# Patient Record
Sex: MALE | Race: BLACK OR AFRICAN AMERICAN | NOT HISPANIC OR LATINO | Employment: OTHER | URBAN - METROPOLITAN AREA
[De-identification: names, ages, dates, MRNs, and addresses within clinical notes are randomized per-mention and may not be internally consistent; named-entity substitution may affect disease eponyms.]

---

## 2020-01-28 NOTE — PLAN OF CARE
(Physician in Lead of Transfers)   Outside Transfer Acceptance Note / Regional Referral Center      Upon patient arrival to floor, please call extension [phone] (if no answer, this will flip to a beeper, so enter your call back number) for Hospital Medicine admit team assignment and for additional admit orders for the patient.  Do not page the attending physician associated with the patient on arrival (this physician may not be on duty at the time of arrival).  Rather, always call [phone] to reach the triage physician for orders and team assignment.      Transferring Physician:  Dr. Fox    Accepting Physician: Susan Aviles MD    Date of Acceptance: 01/28/2020    Transferring Facility: [facility]    Reason for Transfer: Needs hepatology    Report from Transferring Physician/Hospital course: 53 y/o male presenting the the above ER with approximately 1 week history of nausea, vomiting, weakness, abdominal pain and jaundice. Presented on 1/24 but labs not drawn at that time as he eloped. Returned tonight due to above symptoms. Bili 23.3, AST 1771, , Alk ohos 136. Hep C positive. Denies any alcohol or tylenol use. Hemodynamically stable. Transferring for hepatology evaluation for new liver failure.       Labs & Radiographs: see transfer records      To Do List:   1) Vitals q15 mins during the 1st hr of arrival then q30 mins during the 2nd hr   2) Telemetry  3) Hepatology consult      Susan Aviles MD  Hospital Medicine Staff

## 2020-01-29 ENCOUNTER — HOSPITAL ENCOUNTER (INPATIENT)
Facility: HOSPITAL | Age: 55
LOS: 1 days | Discharge: LEFT BEFORE DISCHARGE | DRG: 443 | End: 2020-01-30
Attending: HOSPITALIST | Admitting: HOSPITALIST
Payer: MEDICAID

## 2020-01-29 DIAGNOSIS — R17 JAUNDICE: ICD-10-CM

## 2020-01-29 DIAGNOSIS — K72.90 LIVER FAILURE: ICD-10-CM

## 2020-01-29 DIAGNOSIS — K72.00 ACUTE LIVER FAILURE WITHOUT HEPATIC COMA: ICD-10-CM

## 2020-01-29 DIAGNOSIS — B18.2 CHRONIC HEPATITIS C WITHOUT HEPATIC COMA: ICD-10-CM

## 2020-01-29 DIAGNOSIS — K72.00 ACUTE LIVER FAILURE: ICD-10-CM

## 2020-01-29 PROBLEM — K21.9 GASTROESOPHAGEAL REFLUX DISEASE WITHOUT ESOPHAGITIS: Status: ACTIVE | Noted: 2020-01-29

## 2020-01-29 PROBLEM — Z72.0 TOBACCO ABUSE: Status: ACTIVE | Noted: 2020-01-29

## 2020-01-29 PROBLEM — B17.9 ACUTE HEPATITIS: Status: ACTIVE | Noted: 2020-01-29

## 2020-01-29 LAB
A1AT SERPL-MCNC: 262 MG/DL (ref 100–190)
ABO + RH BLD: NORMAL
AFP SERPL-MCNC: 33 NG/ML (ref 0–8.4)
ALBUMIN SERPL BCP-MCNC: 2.7 G/DL (ref 3.5–5.2)
ALBUMIN SERPL BCP-MCNC: 3.4 G/DL (ref 3.5–5.2)
ALP SERPL-CCNC: 175 U/L (ref 55–135)
ALP SERPL-CCNC: 229 U/L (ref 55–135)
ALT SERPL W/O P-5'-P-CCNC: 1073 U/L (ref 10–44)
ALT SERPL W/O P-5'-P-CCNC: 875 U/L (ref 10–44)
AMMONIA PLAS-SCNC: 66 UMOL/L (ref 10–50)
AMPHET+METHAMPHET UR QL: NORMAL
AMPHET+METHAMPHET UR QL: NORMAL
ANION GAP SERPL CALC-SCNC: 12 MMOL/L (ref 8–16)
ANION GAP SERPL CALC-SCNC: 8 MMOL/L (ref 8–16)
APAP SERPL-MCNC: 4 UG/ML (ref 10–20)
ASCENDING AORTA: 3.07 CM
AST SERPL-CCNC: 1633 U/L (ref 10–40)
AST SERPL-CCNC: 2052 U/L (ref 10–40)
AV INDEX (PROSTH): 0.82
AV MEAN GRADIENT: 3 MMHG
AV PEAK GRADIENT: 5 MMHG
AV VALVE AREA: 3.25 CM2
AV VELOCITY RATIO: 0.81
BACTERIA #/AREA URNS AUTO: NORMAL /HPF
BARBITURATES UR QL SCN>200 NG/ML: NEGATIVE
BARBITURATES UR QL SCN>200 NG/ML: NEGATIVE
BASOPHILS # BLD AUTO: 0.06 K/UL (ref 0–0.2)
BASOPHILS NFR BLD: 0.6 % (ref 0–1.9)
BENZODIAZ UR QL SCN>200 NG/ML: NEGATIVE
BENZODIAZ UR QL SCN>200 NG/ML: NEGATIVE
BILIRUB SERPL-MCNC: 22.5 MG/DL (ref 0.1–1)
BILIRUB SERPL-MCNC: 26.9 MG/DL (ref 0.1–1)
BILIRUB UR QL STRIP: ABNORMAL
BLD GP AB SCN CELLS X3 SERPL QL: NORMAL
BNP SERPL-MCNC: <10 PG/ML (ref 0–99)
BSA FOR ECHO PROCEDURE: 2.19 M2
BUN SERPL-MCNC: 12 MG/DL (ref 6–20)
BUN SERPL-MCNC: 9 MG/DL (ref 6–20)
BZE UR QL SCN: NEGATIVE
BZE UR QL SCN: NEGATIVE
CALCIUM SERPL-MCNC: 8.3 MG/DL (ref 8.7–10.5)
CALCIUM SERPL-MCNC: 9.2 MG/DL (ref 8.7–10.5)
CANNABINOIDS UR QL SCN: NEGATIVE
CANNABINOIDS UR QL SCN: NEGATIVE
CERULOPLASMIN SERPL-MCNC: 41 MG/DL (ref 15–45)
CHLORIDE SERPL-SCNC: 102 MMOL/L (ref 95–110)
CHLORIDE SERPL-SCNC: 105 MMOL/L (ref 95–110)
CLARITY UR REFRACT.AUTO: CLEAR
CO2 SERPL-SCNC: 18 MMOL/L (ref 23–29)
CO2 SERPL-SCNC: 27 MMOL/L (ref 23–29)
COLOR UR AUTO: ABNORMAL
CREAT SERPL-MCNC: 0.9 MG/DL (ref 0.5–1.4)
CREAT SERPL-MCNC: 1.1 MG/DL (ref 0.5–1.4)
CREAT UR-MCNC: 71 MG/DL (ref 23–375)
CREAT UR-MCNC: 71 MG/DL (ref 23–375)
CV ECHO LV RWT: 0.36 CM
DIFFERENTIAL METHOD: ABNORMAL
DOP CALC AO PEAK VEL: 1.17 M/S
DOP CALC AO VTI: 24.56 CM
DOP CALC LVOT AREA: 4 CM2
DOP CALC LVOT DIAMETER: 2.25 CM
DOP CALC LVOT PEAK VEL: 0.95 M/S
DOP CALC LVOT STROKE VOLUME: 79.72 CM3
DOP CALCLVOT PEAK VEL VTI: 20.06 CM
E WAVE DECELERATION TIME: 187.97 MSEC
E/A RATIO: 0.75
E/E' RATIO: 9.06 M/S
ECHO LV POSTERIOR WALL: 0.99 CM (ref 0.6–1.1)
EOSINOPHIL # BLD AUTO: 0.4 K/UL (ref 0–0.5)
EOSINOPHIL NFR BLD: 3.8 % (ref 0–8)
ERYTHROCYTE [DISTWIDTH] IN BLOOD BY AUTOMATED COUNT: 19.5 % (ref 11.5–14.5)
EST. GFR  (AFRICAN AMERICAN): >60 ML/MIN/1.73 M^2
EST. GFR  (AFRICAN AMERICAN): >60 ML/MIN/1.73 M^2
EST. GFR  (NON AFRICAN AMERICAN): >60 ML/MIN/1.73 M^2
EST. GFR  (NON AFRICAN AMERICAN): >60 ML/MIN/1.73 M^2
ETHANOL UR-MCNC: <10 MG/DL
ETHANOL UR-MCNC: <10 MG/DL
FERRITIN SERPL-MCNC: 2513 NG/ML (ref 20–300)
FRACTIONAL SHORTENING: 31 % (ref 28–44)
GLUCOSE SERPL-MCNC: 100 MG/DL (ref 70–110)
GLUCOSE SERPL-MCNC: 92 MG/DL (ref 70–110)
GLUCOSE UR QL STRIP: NEGATIVE
HAV IGM SERPL QL IA: NEGATIVE
HBV CORE IGM SERPL QL IA: NEGATIVE
HBV CORE IGM SERPL QL IA: NEGATIVE
HBV SURFACE AB SER-ACNC: POSITIVE M[IU]/ML
HBV SURFACE AG SERPL QL IA: NEGATIVE
HCT VFR BLD AUTO: 46 % (ref 40–54)
HCV AB SERPL QL IA: POSITIVE
HEPATITIS A ANTIBODY, IGG: POSITIVE
HGB BLD-MCNC: 14.6 G/DL (ref 14–18)
HGB UR QL STRIP: ABNORMAL
HIV 1+2 AB+HIV1 P24 AG SERPL QL IA: NEGATIVE
IMM GRANULOCYTES # BLD AUTO: 0.05 K/UL (ref 0–0.04)
IMM GRANULOCYTES NFR BLD AUTO: 0.5 % (ref 0–0.5)
INR PPP: 1.4 (ref 0.8–1.2)
INR PPP: 1.5 (ref 0.8–1.2)
INTERVENTRICULAR SEPTUM: 0.97 CM (ref 0.6–1.1)
KETONES UR QL STRIP: ABNORMAL
LA MAJOR: 5.01 CM
LA MINOR: 4.11 CM
LA WIDTH: 3.2 CM
LEFT ATRIUM SIZE: 3.15 CM
LEFT ATRIUM VOLUME INDEX: 17.8 ML/M2
LEFT ATRIUM VOLUME: 38.69 CM3
LEFT INTERNAL DIMENSION IN SYSTOLE: 3.8 CM (ref 2.1–4)
LEFT VENTRICLE DIASTOLIC VOLUME INDEX: 66.82 ML/M2
LEFT VENTRICLE DIASTOLIC VOLUME: 145.29 ML
LEFT VENTRICLE MASS INDEX: 95 G/M2
LEFT VENTRICLE SYSTOLIC VOLUME INDEX: 28.5 ML/M2
LEFT VENTRICLE SYSTOLIC VOLUME: 61.95 ML
LEFT VENTRICULAR INTERNAL DIMENSION IN DIASTOLE: 5.47 CM (ref 3.5–6)
LEFT VENTRICULAR MASS: 205.69 G
LEUKOCYTE ESTERASE UR QL STRIP: NEGATIVE
LIPASE SERPL-CCNC: 23 U/L (ref 4–60)
LV LATERAL E/E' RATIO: 9.63 M/S
LV SEPTAL E/E' RATIO: 8.56 M/S
LYMPHOCYTES # BLD AUTO: 3 K/UL (ref 1–4.8)
LYMPHOCYTES NFR BLD: 31.4 % (ref 18–48)
MAGNESIUM SERPL-MCNC: 2.1 MG/DL (ref 1.6–2.6)
MCH RBC QN AUTO: 30 PG (ref 27–31)
MCHC RBC AUTO-ENTMCNC: 31.7 G/DL (ref 32–36)
MCV RBC AUTO: 95 FL (ref 82–98)
METHADONE UR QL SCN>300 NG/ML: NEGATIVE
METHADONE UR QL SCN>300 NG/ML: NEGATIVE
MICROSCOPIC COMMENT: NORMAL
MITOCHONDRIA AB TITR SER IF: NORMAL {TITER}
MONOCYTES # BLD AUTO: 1.3 K/UL (ref 0.3–1)
MONOCYTES NFR BLD: 13.7 % (ref 4–15)
MV PEAK A VEL: 1.02 M/S
MV PEAK E VEL: 0.77 M/S
NEUTROPHILS # BLD AUTO: 4.8 K/UL (ref 1.8–7.7)
NEUTROPHILS NFR BLD: 50 % (ref 38–73)
NITRITE UR QL STRIP: NEGATIVE
NRBC BLD-RTO: 0 /100 WBC
OPIATES UR QL SCN: NEGATIVE
OPIATES UR QL SCN: NEGATIVE
PCP UR QL SCN>25 NG/ML: NEGATIVE
PCP UR QL SCN>25 NG/ML: NEGATIVE
PH UR STRIP: 6 [PH] (ref 5–8)
PHOSPHATE SERPL-MCNC: 2.7 MG/DL (ref 2.7–4.5)
PISA TR MAX VEL: 2.96 M/S
PLATELET # BLD AUTO: 303 K/UL (ref 150–350)
PMV BLD AUTO: 11.1 FL (ref 9.2–12.9)
POTASSIUM SERPL-SCNC: 3.9 MMOL/L (ref 3.5–5.1)
POTASSIUM SERPL-SCNC: 4.2 MMOL/L (ref 3.5–5.1)
PROT SERPL-MCNC: 6.7 G/DL (ref 6–8.4)
PROT SERPL-MCNC: 8 G/DL (ref 6–8.4)
PROT UR QL STRIP: NEGATIVE
PROTHROMBIN TIME: 13.6 SEC (ref 9–12.5)
PROTHROMBIN TIME: 14.3 SEC (ref 9–12.5)
PULM VEIN S/D RATIO: 1.25
PV PEAK D VEL: 0.44 M/S
PV PEAK S VEL: 0.55 M/S
RA MAJOR: 4.95 CM
RA PRESSURE: 3 MMHG
RA WIDTH: 3.11 CM
RBC # BLD AUTO: 4.87 M/UL (ref 4.6–6.2)
RBC #/AREA URNS AUTO: 0 /HPF (ref 0–4)
RIGHT VENTRICULAR END-DIASTOLIC DIMENSION: 3.71 CM
RV TISSUE DOPPLER FREE WALL SYSTOLIC VELOCITY 1 (APICAL 4 CHAMBER VIEW): 18.85 CM/S
SINUS: 3.39 CM
SODIUM SERPL-SCNC: 135 MMOL/L (ref 136–145)
SODIUM SERPL-SCNC: 137 MMOL/L (ref 136–145)
SP GR UR STRIP: 1.01 (ref 1–1.03)
SQUAMOUS #/AREA URNS AUTO: 0 /HPF
STJ: 3.24 CM
TDI LATERAL: 0.08 M/S
TDI SEPTAL: 0.09 M/S
TDI: 0.09 M/S
TOXICOLOGY INFORMATION: NORMAL
TOXICOLOGY INFORMATION: NORMAL
TR MAX PG: 35 MMHG
TRICUSPID ANNULAR PLANE SYSTOLIC EXCURSION: 2.01 CM
TSH SERPL DL<=0.005 MIU/L-ACNC: 1.15 UIU/ML (ref 0.4–4)
TV REST PULMONARY ARTERY PRESSURE: 38 MMHG
URN SPEC COLLECT METH UR: ABNORMAL
WBC # BLD AUTO: 9.53 K/UL (ref 3.9–12.7)
WBC #/AREA URNS AUTO: 2 /HPF (ref 0–5)

## 2020-01-29 PROCEDURE — 82784 ASSAY IGA/IGD/IGG/IGM EACH: CPT

## 2020-01-29 PROCEDURE — 85610 PROTHROMBIN TIME: CPT | Mod: 91

## 2020-01-29 PROCEDURE — 80053 COMPREHEN METABOLIC PANEL: CPT

## 2020-01-29 PROCEDURE — 80329 ANALGESICS NON-OPIOID 1 OR 2: CPT

## 2020-01-29 PROCEDURE — 83690 ASSAY OF LIPASE: CPT

## 2020-01-29 PROCEDURE — 99223 PR INITIAL HOSPITAL CARE,LEVL III: ICD-10-PCS | Mod: ,,, | Performed by: HOSPITALIST

## 2020-01-29 PROCEDURE — 80307 DRUG TEST PRSMV CHEM ANLYZR: CPT

## 2020-01-29 PROCEDURE — 87522 HEPATITIS C REVRS TRNSCRPJ: CPT

## 2020-01-29 PROCEDURE — 80074 ACUTE HEPATITIS PANEL: CPT

## 2020-01-29 PROCEDURE — 86235 NUCLEAR ANTIGEN ANTIBODY: CPT | Mod: 91

## 2020-01-29 PROCEDURE — 86790 VIRUS ANTIBODY NOS: CPT

## 2020-01-29 PROCEDURE — 86704 HEP B CORE ANTIBODY TOTAL: CPT

## 2020-01-29 PROCEDURE — 86256 FLUORESCENT ANTIBODY TITER: CPT

## 2020-01-29 PROCEDURE — 82105 ALPHA-FETOPROTEIN SERUM: CPT

## 2020-01-29 PROCEDURE — 84443 ASSAY THYROID STIM HORMONE: CPT

## 2020-01-29 PROCEDURE — 83880 ASSAY OF NATRIURETIC PEPTIDE: CPT

## 2020-01-29 PROCEDURE — 84100 ASSAY OF PHOSPHORUS: CPT

## 2020-01-29 PROCEDURE — 82390 ASSAY OF CERULOPLASMIN: CPT

## 2020-01-29 PROCEDURE — 85610 PROTHROMBIN TIME: CPT

## 2020-01-29 PROCEDURE — 86706 HEP B SURFACE ANTIBODY: CPT

## 2020-01-29 PROCEDURE — 82140 ASSAY OF AMMONIA: CPT

## 2020-01-29 PROCEDURE — 86038 ANTINUCLEAR ANTIBODIES: CPT

## 2020-01-29 PROCEDURE — 82103 ALPHA-1-ANTITRYPSIN TOTAL: CPT

## 2020-01-29 PROCEDURE — 80321 ALCOHOLS BIOMARKERS 1OR 2: CPT

## 2020-01-29 PROCEDURE — 81001 URINALYSIS AUTO W/SCOPE: CPT

## 2020-01-29 PROCEDURE — 36415 COLL VENOUS BLD VENIPUNCTURE: CPT

## 2020-01-29 PROCEDURE — 87529 HSV DNA AMP PROBE: CPT

## 2020-01-29 PROCEDURE — 86850 RBC ANTIBODY SCREEN: CPT

## 2020-01-29 PROCEDURE — 83735 ASSAY OF MAGNESIUM: CPT

## 2020-01-29 PROCEDURE — 20600001 HC STEP DOWN PRIVATE ROOM

## 2020-01-29 PROCEDURE — 87799 DETECT AGENT NOS DNA QUANT: CPT

## 2020-01-29 PROCEDURE — 87040 BLOOD CULTURE FOR BACTERIA: CPT | Mod: 59

## 2020-01-29 PROCEDURE — 99223 1ST HOSP IP/OBS HIGH 75: CPT | Mod: ,,, | Performed by: HOSPITALIST

## 2020-01-29 PROCEDURE — 80053 COMPREHEN METABOLIC PANEL: CPT | Mod: 91

## 2020-01-29 PROCEDURE — 85025 COMPLETE CBC W/AUTO DIFF WBC: CPT

## 2020-01-29 PROCEDURE — 82728 ASSAY OF FERRITIN: CPT

## 2020-01-29 PROCEDURE — 86703 HIV-1/HIV-2 1 RESULT ANTBDY: CPT

## 2020-01-29 PROCEDURE — 63600175 PHARM REV CODE 636 W HCPCS: Performed by: HOSPITALIST

## 2020-01-29 RX ORDER — ACETAMINOPHEN 500 MG
500 TABLET ORAL EVERY 6 HOURS PRN
Status: DISCONTINUED | OUTPATIENT
Start: 2020-01-29 | End: 2020-01-29

## 2020-01-29 RX ORDER — IPRATROPIUM BROMIDE AND ALBUTEROL SULFATE 2.5; .5 MG/3ML; MG/3ML
3 SOLUTION RESPIRATORY (INHALATION) EVERY 4 HOURS PRN
Status: DISCONTINUED | OUTPATIENT
Start: 2020-01-29 | End: 2020-01-30 | Stop reason: HOSPADM

## 2020-01-29 RX ORDER — ONDANSETRON 2 MG/ML
4 INJECTION INTRAMUSCULAR; INTRAVENOUS EVERY 6 HOURS PRN
Status: DISCONTINUED | OUTPATIENT
Start: 2020-01-29 | End: 2020-01-30 | Stop reason: HOSPADM

## 2020-01-29 RX ORDER — TALC
6 POWDER (GRAM) TOPICAL NIGHTLY PRN
Status: DISCONTINUED | OUTPATIENT
Start: 2020-01-29 | End: 2020-01-30 | Stop reason: HOSPADM

## 2020-01-29 RX ORDER — PANTOPRAZOLE SODIUM 40 MG/1
40 TABLET, DELAYED RELEASE ORAL DAILY
Status: DISCONTINUED | OUTPATIENT
Start: 2020-01-29 | End: 2020-01-29

## 2020-01-29 RX ADMIN — ACETYLCYSTEINE 13900 MG: 200 INJECTION, SOLUTION INTRAVENOUS at 11:01

## 2020-01-29 RX ADMIN — SODIUM CHLORIDE 500 ML: 0.9 INJECTION, SOLUTION INTRAVENOUS at 11:01

## 2020-01-29 RX ADMIN — ACETYLCYSTEINE 4600 MG: 200 INJECTION, SOLUTION INTRAVENOUS at 12:01

## 2020-01-29 RX ADMIN — ACETYLCYSTEINE 9300 MG: 200 INJECTION, SOLUTION INTRAVENOUS at 06:01

## 2020-01-29 NOTE — PLAN OF CARE
Pt arrived from [facility] at 0115. POC reviewed with pt. JJ. JAY&Ox4. Pt has been NPO since 0230 for ultrasound of the liver. Pt has a history of leaving the unit in prior hospital visits. Pt has been told not to leave our unit. No acute complications during shift. Will continue to monitor.

## 2020-01-29 NOTE — PHARMACY MED REC
"Admission Medication Reconciliation - Pharmacy Consult Note    The home medication history was taken by Patrizia Conn, Pharmacy Technician.    You may go to "Admission" then "Reconcile Home Medications" tabs to review and/or act upon these items.      No issues noted with the medication reconciliation.    Stephy Delgado, PharmD, BCPS  [id number]                  .    .          "

## 2020-01-29 NOTE — ASSESSMENT & PLAN NOTE
Mr Bowie is a 54 year old man with history of GERD and drug abuse, who is presenting to the hospital with concern for acute liver injury.    Unclear etiology of liver injury. Mental status unremarkable. Does not appear to have underlying ESLD. History of recent viral prodrome (abd pain, n/v/d) is suggestive of viral etiology. HepA IGM negative but IGG positive; no clear history of hepA vaccination ?relapsing hep A. Recent drug use including shared needle raises rise of HepA or HepC. Is HepC AB positive with no known prior exposure therefore acute HepC on differential.    Plan  - continue supportive care, NPO at [facility], pending review of labs may consider hep biopsy  - HCV AB+. Titre pending  - HBsAB positive, likely immunized, will check HepB core total  - check EBV, CMV, HSV  - AI serologies  - PETH Test (denies ETOH use)  - elevated AFP, likey due to acute inflammation, RUQ duplex negative  - coagulopathy: trial of vitamin K 10mg IV x3 day  - mental status: currently alert and oriented, avoid narcotics/sedatives

## 2020-01-29 NOTE — HPI
Mr Bowie is a 54 year old man with history of GERD and drug abuse, who is presenting to the hospital with concern for acute liver injury.    Approximately 10 days developed nausea, vomiting (bilious but some blood streak) and diarrhea and diffuse abdominal pain and fatigue. He felt better over approximately 1 week and returned to work, but 2 days later he felt fatigued, dyspneic, and jaundiced so went to the ED for evaluation. He was seen in the ED 1/24 (no records available) but reportedly eloped before labs drawn (says he saw other people in hospital who were sick but he didn't think he was sick and left). He went to the ED at [facility] on 1/[facility] ([facility]) where labs notable for Tb 23, AST 1.7k, , HCV positive and was transferred to [facility] [place].     He reports he has never had problems with the liver in the past. Denies any family history of liver disease. Currently denies abdominal pain, n/v or diarrhea. He endorses history of Meth use (snort and one attempted IV) including most recently sharing needle with a friend who was subsequently hospitalized with acute hepatitis (details unclear). He denies eating out/fast food. No recent sick contacts or travel. On admission, labs notable for rise in Tb to 23, AST 2.0k, ALT 1.0k, INR 1.4.    Social History  - Herbal / OTC / Online medications: no, 4x 'sinus' tablets prior to admission  - APAP: No  - ETOH: no ETOH  - Drug use: Meth use, last 3 months ago, remote cocaine use  - Smoking: yes, 1 pack/week  - Occupation: [employer]  - Family: wife passed away

## 2020-01-29 NOTE — CONSULTS
[facility]  Gastroenterology  Consult Note    Patient Name: Hal Bowie  MRN: [id number]  Admission Date: 1/29/2020  Hospital Length of Stay: 0 days  Code Status: No Order   Attending Provider: Kiana Moses MD   Consulting Provider: Ravi Aburto MD  Primary Care Physician: [facility]  Principal Problem:Acute liver failure    Inpatient consult to Hepatology  Consult performed by: Ravi Aburto MD  Consult ordered by: Shirley Randolph DO        Subjective:     HPI:  Mr Bowie is a 54 year old man with history of GERD and drug abuse, who is presenting to the hospital with concern for acute liver injury.    Approximately 10 days developed nausea, vomiting (bilious but some blood streak) and diarrhea and diffuse abdominal pain and fatigue. He felt better over approximately 1 week and returned to work, but 2 days later he felt fatigued, dyspneic, and jaundiced so went to the ED for evaluation. He was seen in the ED 1/24 (no records available) but reportedly eloped before labs drawn (says he saw other people in hospital who were sick but he didn't think he was sick and left). He went [facility] the ED [facility] on 1/[facility] ([place]) where labs notable for Tb 23, AST 1.7k, , HCV positive and was transferred to [facility] for further evaluation.     He reports he has never had problems with the liver in the past. Denies any family history of liver disease. Currently denies abdominal pain, n/v or diarrhea. He endorses history of Meth use (snort and one attempted IV) including most recently sharing needle with a friend who was subsequently hospitalized with acute hepatitis (details unclear). He denies eating out/fast food. No recent sick contacts or travel. On admission, labs notable for rise in Tb to 23, AST 2.0k, ALT 1.0k, INR 1.4.    Social History  - Herbal / OTC / Online medications: no, 4x 'sinus' tablets prior to admission  - APAP: No  - ETOH: no ETOH  - Drug use: Meth use, last  "3 months ago, remote cocaine use  - Smoking: yes, 1 pack/week  - Occupation: [employer]  - Family: wife passed away    History reviewed. No pertinent past medical history.    Past Surgical History:   Procedure Laterality Date    BACK SURGERY      HERNIA REPAIR         Review of patient's allergies indicates:  No Known Allergies  Family History     Problem Relation (Age of Onset)    Cancer Father    Hypertension Mother        Tobacco Use    Smoking status: Current Every Day Smoker     Packs/day: 0.25   Substance and Sexual Activity    Alcohol use: Not Currently    Drug use: Not Currently     Types: Methamphetamines     Comment: "ended around December 2019"    Sexual activity: Not Currently     Review of Systems   Constitutional: Positive for fatigue. Negative for activity change, appetite change, chills, diaphoresis, fever and unexpected weight change.   HENT: Negative for sore throat and trouble swallowing.    Eyes: Negative for visual disturbance.   Respiratory: Negative for chest tightness and shortness of breath.    Cardiovascular: Negative for chest pain and leg swelling.   Gastrointestinal: Positive for abdominal pain, nausea and vomiting. Negative for abdominal distention, anal bleeding, blood in stool, constipation and diarrhea.   Genitourinary: Negative for dysuria and hematuria.   Musculoskeletal: Negative for arthralgias and myalgias.   Skin: Negative for rash.   Neurological: Negative for dizziness, weakness, light-headedness and headaches.   Psychiatric/Behavioral: Negative for agitation and confusion.     Objective:     Vital Signs (Most Recent):  Temp: 97.5 °F (36.4 °C) (01/29/20 1122)  Pulse: 66 (01/29/20 1122)  Resp: 18 (01/29/20 1122)  BP: 126/74 (01/29/20 1122)  SpO2: 98 % (01/29/20 1122) Vital Signs (24h Range):  Temp:  [97.5 °F (36.4 °C)-97.9 °F (36.6 °C)] 97.5 °F (36.4 °C)  Pulse:  [66-98] 66  Resp:  [13-18] 18  SpO2:  [97 %-99 %] 98 %  BP: (126-187)/(71-99) 126/74     Weight: 92.9 kg " (204 lb 12.9 oz) (01/29/20 0443)  Body mass index is 28.56 kg/m².      Intake/Output Summary (Last 24 hours) at 1/29/2020 1203  Last data filed at 1/29/2020 1000  Gross per 24 hour   Intake 120 ml   Output 200 ml   Net -80 ml       Lines/Drains/Airways     Peripheral Intravenous Line                 Peripheral IV - Single Lumen 01/29/20 20 G Anterior;Left Forearm less than 1 day                Physical Exam   Constitutional: He is oriented to person, place, and time. No distress.   Sitting in bedside chair, appears comfortable, slightly restless.   HENT:   Head: Normocephalic and atraumatic.   Mouth/Throat: Oropharynx is clear and moist. No oropharyngeal exudate.   Eyes: Conjunctivae are normal. Scleral icterus is present.   Neck: No JVD present.   Cardiovascular: Normal rate, regular rhythm, normal heart sounds and intact distal pulses.   Pulmonary/Chest: Effort normal and breath sounds normal. No respiratory distress.   Abdominal: Soft. Bowel sounds are normal. He exhibits no distension and no mass. There is no tenderness. There is no rebound and no guarding.   Musculoskeletal: He exhibits no edema or tenderness.   Lymphadenopathy:     He has no cervical adenopathy.   Neurological: He is alert and oriented to person, place, and time.   Alert, not confused, no asterixis.   Skin: Skin is warm. Capillary refill takes less than 2 seconds. He is not diaphoretic.   Psychiatric: He has a normal mood and affect. His behavior is normal. Judgment and thought content normal.   Nursing note and vitals reviewed.      Significant Labs:  All pertinent lab results from the last 24 hours have been reviewed.    Significant Imaging:  Imaging results within the past 24 hours have been reviewed.    Assessment/Plan:     * Acute liver failure  Mr Bowie is a 54 year old man with history of GERD and drug abuse, who is presenting to the hospital with concern for acute liver injury.    Unclear etiology of liver injury. Mental status  unremarkable. Does not appear to have underlying ESLD. History of recent viral prodrome (abd pain, n/v/d) is suggestive of viral etiology. HepA IGM negative but IGG positive; no clear history of hepA vaccination ?relapsing hep A. Recent drug use including shared needle raises rise of HepA or HepC. Is HepC AB positive with no known prior exposure therefore acute HepC on differential.    Plan  - continue supportive care, NPO at [facility], pending review of labs may consider hep biopsy  - HCV AB+. Titre pending  - HBsAB positive, likely immunized, will check HepB core total  - check EBV, CMV, HSV  - AI serologies  - PETH Test (denies ETOH use)  - elevated AFP, likey due to acute inflammation, RUQ duplex negative  - coagulopathy: trial of vitamin K 10mg IV x3 day  - mental status: currently alert and oriented, avoid narcotics/sedatives        Thank you for your consult. I will follow-up with patient. Please contact us if you have any additional questions.    Ravi Aburto MD  Gastroenterology  [facility]

## 2020-01-29 NOTE — SUBJECTIVE & OBJECTIVE
"History reviewed. No pertinent past medical history.    Past Surgical History:   Procedure Laterality Date    BACK SURGERY      HERNIA REPAIR         Review of patient's allergies indicates:  No Known Allergies  Family History     Problem Relation (Age of Onset)    Cancer Father    Hypertension Mother        Tobacco Use    Smoking status: Current Every Day Smoker     Packs/day: 0.25   Substance and Sexual Activity    Alcohol use: Not Currently    Drug use: Not Currently     Types: Methamphetamines     Comment: "ended around December 2019"    Sexual activity: Not Currently     Review of Systems   Constitutional: Positive for fatigue. Negative for activity change, appetite change, chills, diaphoresis, fever and unexpected weight change.   HENT: Negative for sore throat and trouble swallowing.    Eyes: Negative for visual disturbance.   Respiratory: Negative for chest tightness and shortness of breath.    Cardiovascular: Negative for chest pain and leg swelling.   Gastrointestinal: Positive for abdominal pain, nausea and vomiting. Negative for abdominal distention, anal bleeding, blood in stool, constipation and diarrhea.   Genitourinary: Negative for dysuria and hematuria.   Musculoskeletal: Negative for arthralgias and myalgias.   Skin: Negative for rash.   Neurological: Negative for dizziness, weakness, light-headedness and headaches.   Psychiatric/Behavioral: Negative for agitation and confusion.     Objective:     Vital Signs (Most Recent):  Temp: 97.5 °F (36.4 °C) (01/29/20 1122)  Pulse: 66 (01/29/20 1122)  Resp: 18 (01/29/20 1122)  BP: 126/74 (01/29/20 1122)  SpO2: 98 % (01/29/20 1122) Vital Signs (24h Range):  Temp:  [97.5 °F (36.4 °C)-97.9 °F (36.6 °C)] 97.5 °F (36.4 °C)  Pulse:  [66-98] 66  Resp:  [13-18] 18  SpO2:  [97 %-99 %] 98 %  BP: (126-187)/(71-99) 126/74     Weight: 92.9 kg (204 lb 12.9 oz) (01/29/20 0443)  Body mass index is 28.56 kg/m².      Intake/Output Summary (Last 24 hours) at 1/29/2020 " 1203  Last data filed at 1/29/2020 1000  Gross per 24 hour   Intake 120 ml   Output 200 ml   Net -80 ml       Lines/Drains/Airways     Peripheral Intravenous Line                 Peripheral IV - Single Lumen 01/29/20 20 G Anterior;Left Forearm less than 1 day                Physical Exam   Constitutional: He is oriented to person, place, and time. No distress.   Sitting in bedside chair, appears comfortable, slightly restless.   HENT:   Head: Normocephalic and atraumatic.   Mouth/Throat: Oropharynx is clear and moist. No oropharyngeal exudate.   Eyes: Conjunctivae are normal. Scleral icterus is present.   Neck: No JVD present.   Cardiovascular: Normal rate, regular rhythm, normal heart sounds and intact distal pulses.   Pulmonary/Chest: Effort normal and breath sounds normal. No respiratory distress.   Abdominal: Soft. Bowel sounds are normal. He exhibits no distension and no mass. There is no tenderness. There is no rebound and no guarding.   Musculoskeletal: He exhibits no edema or tenderness.   Lymphadenopathy:     He has no cervical adenopathy.   Neurological: He is alert and oriented to person, place, and time.   Alert, not confused, no asterixis.   Skin: Skin is warm. Capillary refill takes less than 2 seconds. He is not diaphoretic.   Psychiatric: He has a normal mood and affect. His behavior is normal. Judgment and thought content normal.   Nursing note and vitals reviewed.      Significant Labs:  All pertinent lab results from the last 24 hours have been reviewed.    Significant Imaging:  Imaging results within the past 24 hours have been reviewed.

## 2020-01-29 NOTE — H&P
[facility]    History & Physical      Patient Name: Hal Bowie  MRN: [id number]  Admission Date: 1/29/2020  Attending Physician: Kiana Moses MD   Primary Care Provider: [facility] Medicine Team: Networked reference to record PCT  Shirley Randolph DO     Patient information was obtained from Outside records .     Subjective:     Principal Problem:Acute liver failure    Chief Complaint: No chief complaint on file.       HPI:     Mr. Bowie is a 54 year old  healthy male presents as a transfer from [facility] ER for management of acute liver failure and jaundice. Patient states he developed nausea, vomiting, diarrhea mixed with blood, abdominal cramping, fatigue about 10 days ago which resolved after one week. He felt good and went back to work as  until 2 days ago when he developed jaundice and dark urine which prompted him to go to local ER yesterday. Work up in ED significant for acute liver failure with T. Bili 23, AST 1800,  and positive HCV antibody. US and CT abdomen unremarkable except fatty liver and contacted gallbladder. He endorses using IV methamphetamine last summer up until one month ago. He denies other hepatitis risk factors including tattoos, blood transfusion, high risk sexual behavior, recent travel or sick contact. He endorses smoking cigarettes 1 pack per week but denies history alcohol or other illicit drug use. He also denies excessive tylenol use or other OTC medication use. He reports some weight gain with mild distended abdomen and intermittent leg swelling in recent past but denies chest pain, sob, SAM, orthopnea, easy bruising or bleeding.          History reviewed. No pertinent past medical history.    Past Surgical History:   Procedure Laterality Date    BACK SURGERY      HERNIA REPAIR         Review of patient's allergies indicates:  No Known Allergies    No current  "facility-administered medications on file prior to encounter.      No current outpatient medications on file prior to encounter.     Family History     Problem Relation (Age of Onset)    Cancer Father    Hypertension Mother        Tobacco Use    Smoking status: Current Every Day Smoker     Packs/day: 0.25   Substance and Sexual Activity    Alcohol use: Not Currently    Drug use: Not Currently     Types: Methamphetamines     Comment: "ended around December 2019"    Sexual activity: Not Currently     Review of Systems   Constitutional: Positive for fatigue. Negative for activity change, appetite change, chills, diaphoresis, fever and unexpected weight change.   HENT: Negative for congestion, dental problem, drooling, ear discharge, ear pain, facial swelling, hearing loss, mouth sores, nosebleeds, postnasal drip, rhinorrhea, sinus pressure, sneezing, sore throat, tinnitus, trouble swallowing and voice change.    Eyes: Negative for photophobia, pain, discharge, redness, itching and visual disturbance.   Respiratory: Negative for apnea, cough, choking, chest tightness, shortness of breath, wheezing and stridor.    Cardiovascular: Positive for leg swelling (intermittent leg swelling ). Negative for chest pain and palpitations.   Gastrointestinal: Positive for abdominal distention. Negative for abdominal pain, anal bleeding, blood in stool, constipation, diarrhea, nausea, rectal pain and vomiting.   Endocrine: Negative for cold intolerance, heat intolerance, polydipsia, polyphagia and polyuria.   Genitourinary: Negative for decreased urine volume, difficulty urinating, discharge, dysuria, enuresis, flank pain, frequency, genital sores, hematuria, penile pain, penile swelling, scrotal swelling, testicular pain and urgency.        Dark urine    Musculoskeletal: Negative for arthralgias, back pain, gait problem, joint swelling, myalgias, neck pain and neck stiffness.   Skin: Negative for color change, pallor, rash and " wound.   Allergic/Immunologic: Negative for environmental allergies, food allergies and immunocompromised state.   Neurological: Negative for dizziness, tremors, seizures, syncope, facial asymmetry, speech difficulty, weakness, light-headedness, numbness and headaches.   Hematological: Negative for adenopathy. Does not bruise/bleed easily.   Psychiatric/Behavioral: Negative for agitation, behavioral problems, confusion, decreased concentration, dysphoric mood, hallucinations, self-injury, sleep disturbance and suicidal ideas. The patient is not nervous/anxious and is not hyperactive.      Objective:     Vital Signs (Most Recent):  Temp: 97.7 °F (36.5 °C) (01/29/20 0445)  Pulse: 71 (01/29/20 0445)  Resp: 18 (01/29/20 0445)  BP: 131/71 (01/29/20 0445)  SpO2: 99 % (01/29/20 0445) Vital Signs (24h Range):  Temp:  [97.5 °F (36.4 °C)-97.8 °F (36.6 °C)] 97.7 °F (36.5 °C)  Pulse:  [71-98] 71  Resp:  [17-18] 18  SpO2:  [97 %-99 %] 99 %  BP: (131-187)/(71-99) 131/71     Weight: 92.9 kg (204 lb 12.9 oz)  Body mass index is 28.56 kg/m².    Physical Exam   Constitutional: He is oriented to person, place, and time. He appears well-developed and well-nourished. No distress.   HENT:   Head: Normocephalic and atraumatic.   Nose: Nose normal.   Mouth/Throat: Oropharynx is clear and moist. No oropharyngeal exudate.   Eyes: Pupils are equal, round, and reactive to light. EOM are normal. Scleral icterus is present.   Neck: Normal range of motion. Neck supple. No JVD present. No tracheal deviation present. No thyromegaly present.   Cardiovascular: Normal rate, regular rhythm, normal heart sounds and intact distal pulses.   No murmur heard.  Pulmonary/Chest: Effort normal and breath sounds normal. No respiratory distress. He has no wheezes. He has no rales. He exhibits no tenderness.   Abdominal: Soft. Bowel sounds are normal. He exhibits distension (mild distention ). He exhibits no mass. There is no tenderness. There is no rebound and  no guarding.   Musculoskeletal: Normal range of motion. He exhibits edema (1+ BL LE pitting edema ). He exhibits no tenderness.   Lymphadenopathy:     He has no cervical adenopathy.   Neurological: He is alert and oriented to person, place, and time. He has normal reflexes. He displays normal reflexes. No cranial nerve deficit. He exhibits normal muscle tone. Coordination normal.   Negative asterixis    Skin: Skin is warm and dry. No rash noted. He is not diaphoretic. No erythema.   Psychiatric: He has a normal mood and affect. Judgment and thought content normal.         CRANIAL NERVES     CN III, IV, VI   Pupils are equal, round, and reactive to light.  Extraocular motions are normal.       Significant Labs:   Recent Results (from the past 24 hour(s))   CBC auto differential    Collection Time: 01/29/20  2:24 AM   Result Value Ref Range    WBC 9.53 3.90 - 12.70 K/uL    RBC 4.87 4.60 - 6.20 M/uL    Hemoglobin 14.6 14.0 - 18.0 g/dL    Hematocrit 46.0 40.0 - 54.0 %    Mean Corpuscular Volume 95 82 - 98 fL    Mean Corpuscular Hemoglobin 30.0 27.0 - 31.0 pg    Mean Corpuscular Hemoglobin Conc 31.7 (L) 32.0 - 36.0 g/dL    RDW 19.5 (H) 11.5 - 14.5 %    Platelets 303 150 - 350 K/uL    MPV 11.1 9.2 - 12.9 fL    Immature Granulocytes 0.5 0.0 - 0.5 %    Gran # (ANC) 4.8 1.8 - 7.7 K/uL    Immature Grans (Abs) 0.05 (H) 0.00 - 0.04 K/uL    Lymph # 3.0 1.0 - 4.8 K/uL    Mono # 1.3 (H) 0.3 - 1.0 K/uL    Eos # 0.4 0.0 - 0.5 K/uL    Baso # 0.06 0.00 - 0.20 K/uL    nRBC 0 0 /100 WBC    Gran% 50.0 38.0 - 73.0 %    Lymph% 31.4 18.0 - 48.0 %    Mono% 13.7 4.0 - 15.0 %    Eosinophil% 3.8 0.0 - 8.0 %    Basophil% 0.6 0.0 - 1.9 %    Differential Method Automated    Comprehensive metabolic panel    Collection Time: 01/29/20  2:24 AM   Result Value Ref Range    Sodium 137 136 - 145 mmol/L    Potassium 3.9 3.5 - 5.1 mmol/L    Chloride 102 95 - 110 mmol/L    CO2 27 23 - 29 mmol/L    Glucose 92 70 - 110 mg/dL    BUN, Bld 12 6 - 20 mg/dL     Creatinine 1.1 0.5 - 1.4 mg/dL    Calcium 9.2 8.7 - 10.5 mg/dL    Total Protein 8.0 6.0 - 8.4 g/dL    Albumin 3.4 (L) 3.5 - 5.2 g/dL    Total Bilirubin 26.9 (H) 0.1 - 1.0 mg/dL    Alkaline Phosphatase 229 (H) 55 - 135 U/L    AST 2,052 (H) 10 - 40 U/L    ALT 1,073 (H) 10 - 44 U/L    Anion Gap 8 8 - 16 mmol/L    eGFR if African American >60.0 >60 mL/min/1.73 m^2    eGFR if non African American >60.0 >60 mL/min/1.73 m^2   Magnesium    Collection Time: 01/29/20  2:24 AM   Result Value Ref Range    Magnesium 2.1 1.6 - 2.6 mg/dL   Phosphorus    Collection Time: 01/29/20  2:24 AM   Result Value Ref Range    Phosphorus 2.7 2.7 - 4.5 mg/dL   Protime-INR    Collection Time: 01/29/20  2:24 AM   Result Value Ref Range    Prothrombin Time 13.6 (H) 9.0 - 12.5 sec    INR 1.4 (H) 0.8 - 1.2   Type & Screen    Collection Time: 01/29/20  2:24 AM   Result Value Ref Range    Group & Rh A POS     Indirect Michael NEG    Ammonia    Collection Time: 01/29/20  2:24 AM   Result Value Ref Range    Ammonia 66 (H) 10 - 50 umol/L   Brain natriuretic peptide    Collection Time: 01/29/20  2:24 AM   Result Value Ref Range    BNP <10 0 - 99 pg/mL   TSH    Collection Time: 01/29/20  2:24 AM   Result Value Ref Range    TSH 1.149 0.400 - 4.000 uIU/mL   Lipase    Collection Time: 01/29/20  2:24 AM   Result Value Ref Range    Lipase 23 4 - 60 U/L   Ceruloplasmin    Collection Time: 01/29/20  2:24 AM   Result Value Ref Range    Ceruloplasmin 41.0 15.0 - 45.0 mg/dL   Alpha-1-antitrypsin    Collection Time: 01/29/20  2:24 AM   Result Value Ref Range    A-1 Antitrypsin, Ser 262 (H) 100 - 190 mg/dL   Ferritin    Collection Time: 01/29/20  2:24 AM   Result Value Ref Range    Ferritin 2,513 (H) 20.0 - 300.0 ng/mL   Acetaminophen level    Collection Time: 01/29/20  2:24 AM   Result Value Ref Range    Acetaminophen (Tylenol), Serum 4.0 (L) 10.0 - 20.0 ug/mL   AFP tumor marker    Collection Time: 01/29/20  2:24 AM   Result Value Ref Range    AFP 33 (H) 0.0 - 8.4  ng/mL         Significant Imaging: I have reviewed and interpreted all pertinent imaging results/findings within the past 24 hours.    Assessment/Plan:     Active Diagnoses:    Diagnosis Date Noted POA    PRINCIPAL PROBLEM:  Acute liver failure [K72.00] 01/29/2020 Yes    Gastroesophageal reflux disease without esophagitis [K21.9] 01/29/2020 Yes    Jaundice [R17] 01/29/2020 Yes    Tobacco abuse [Z72.0] 01/29/2020 Yes      Problems Resolved During this Admission:     # Acute liver failure with jaundice   -clinical presentation consistent with chronic HCV (recent history of IVDU and positive Ab) vs acute Hepatitis A (acute self limiting GI illness)  -No signs of active infection, bleeding or encephalopathy   -he appears very healthy except jaundice   -check acute hepatitis panel, acetaminophen level, PETH   -check HCV RNA and tumor marker AFP  -check HAV and HBV immunity   -check HIV, CMV, EBV, HSV PCR, ferritin, ceruloplasmin, AMA, ASM, antitrypsin to r/o other causes of liver failure    -check US liver with doppler and need diagnostic paracentesis if positive for ascites   -check BNP and 2D ehco given LE edema   -monitor MELD closely with daily labs   -consult hepatology     # GERD  -no acute issue   -protonix daily     DVT PPX : SCDs for now pending platelet and INR    VTE Risk Mitigation (From admission, onward)    None            Shirley Randolph DO  Department of Hospital Medicine   [facility]

## 2020-01-29 NOTE — PLAN OF CARE
HOSPITAL MEDICINE PLAN OF CARE    Patient admitted over night. Chart reviewed. Patient seen and evaluated on rounds today. Doing well with no acute distress.      no abdominal pain.  No encephalopathy.  No bleeding     Ref. Range 1/29/2020 02:24   BILIRUBIN TOTAL Latest Ref Range: 0.1 - 1.0 mg/dL 26.9 (H)   AST Latest Ref Range: 10 - 40 U/L 2,052 (H)   ALT Latest Ref Range: 10 - 44 U/L 1,073 (H)        Ref. Range 1/29/2020 02:24   Hep A IgM Latest Ref Range: Negative  Negative   Hep B C IgM Latest Ref Range: Negative  Negative   Hep B S Ab Unknown Positive (A)   Hepatitis B Surface Ag Latest Ref Range: Negative  Negative   Hepatitis C Ab Latest Ref Range: Negative  Positive (A)   HIV 1/2 Ag/Ab Latest Ref Range: Negative  Negative         Plan :    Acute hepatitis  Chronic hepatitis C without hepatic coma  MELD-Na score: 24 at 1/29/2020  2:24 AM  MELD score: 24 at 1/29/2020  2:24 AM  Calculated from:  Serum Creatinine: 1.1 mg/dL at 1/29/2020  2:24 AM  Serum Sodium: 137 mmol/L at 1/29/2020  2:24 AM  Total Bilirubin: 26.9 mg/dL at 1/29/2020  2:24 AM  INR(ratio): 1.4 at 1/29/2020  2:24 AM  Age: 54 years    -  Unclear etiology of patient liver enzyme elevation at this time.  Does have chronic hepatitis-C.  Has not been treated in the past.  Acute viral panel came back positive for hepatitis a IgG, negative for hepatitis a IgM.  1-2   Weeks prior to patient's admission, patient had nausea vomiting abdominal pain fatigue, symptoms concerning for possible  Acute hepatitis A.  Concern that patient has had hepatitis a at that time, now seroconverted with IgG becoming positive,   Causing significant hepatitis in setting of already underlying hepatitis C  -  No signs of acute liver failure at this time.  INR 1.4.  No encephalopathy  -   Autoimmune markers are pending.  Likely to be negative  -  Patient is active meth user  -  Continue to monitor LFTs and INR b.i.d.  - NAC  Infusion started on 01/29/2020  -  Hepatology is  following.        Patient's note was created using MModal Dictation.  Any errors in syntax may not have been identified and edited on initial review prior to signing this note.    Signing Physician:     Kiana Moses MD  Department of [facility] Medicine   [facility]  Pager [phone]  Spectra [phone]  1/29/2020

## 2020-01-29 NOTE — PLAN OF CARE
Patient is currently off of the floor, unable to complete a discharge planning assessment at this time. Will re-attempt at a later time.    Luiza Miguel RN  Ext [phone]

## 2020-01-30 ENCOUNTER — HOSPITAL ENCOUNTER (INPATIENT)
Facility: HOSPITAL | Age: 55
LOS: 4 days | Discharge: HOME | DRG: 441 | End: 2020-02-03
Attending: EMERGENCY MEDICINE | Admitting: HOSPITALIST
Payer: MEDICAID

## 2020-01-30 VITALS
DIASTOLIC BLOOD PRESSURE: 72 MMHG | TEMPERATURE: 98 F | OXYGEN SATURATION: 97 % | WEIGHT: 206.81 LBS | HEART RATE: 68 BPM | BODY MASS INDEX: 27.41 KG/M2 | HEIGHT: 73 IN | RESPIRATION RATE: 18 BRPM | SYSTOLIC BLOOD PRESSURE: 116 MMHG

## 2020-01-30 DIAGNOSIS — K72.90 LIVER FAILURE WITHOUT HEPATIC COMA, UNSPECIFIED CHRONICITY: Primary | ICD-10-CM

## 2020-01-30 DIAGNOSIS — B17.9 ACUTE HEPATITIS: ICD-10-CM

## 2020-01-30 LAB
ALBUMIN SERPL BCP-MCNC: 2.5 G/DL (ref 3.5–5.2)
ALBUMIN SERPL BCP-MCNC: 2.7 G/DL (ref 3.5–5.2)
ALP SERPL-CCNC: 182 U/L (ref 55–135)
ALP SERPL-CCNC: 186 U/L (ref 55–135)
ALT SERPL W/O P-5'-P-CCNC: 853 U/L (ref 10–44)
ALT SERPL W/O P-5'-P-CCNC: 889 U/L (ref 10–44)
AMMONIA PLAS-SCNC: 57 UMOL/L (ref 10–50)
AMPHET+METHAMPHET UR QL: NORMAL
ANION GAP SERPL CALC-SCNC: 9 MMOL/L (ref 8–16)
ANION GAP SERPL CALC-SCNC: 9 MMOL/L (ref 8–16)
AST SERPL-CCNC: 1601 U/L (ref 10–40)
AST SERPL-CCNC: 1625 U/L (ref 10–40)
BACTERIA #/AREA URNS HPF: NORMAL /HPF
BARBITURATES UR QL SCN>200 NG/ML: NEGATIVE
BASOPHILS # BLD AUTO: 0.02 K/UL (ref 0–0.2)
BASOPHILS # BLD AUTO: 0.04 K/UL (ref 0–0.2)
BASOPHILS NFR BLD: 0.2 % (ref 0–1.9)
BASOPHILS NFR BLD: 0.5 % (ref 0–1.9)
BENZODIAZ UR QL SCN>200 NG/ML: NEGATIVE
BILIRUB SERPL-MCNC: 22 MG/DL (ref 0.1–1)
BILIRUB SERPL-MCNC: 24.3 MG/DL (ref 0.1–1)
BILIRUB UR QL STRIP: ABNORMAL
BILIRUB UR QL STRIP: ABNORMAL
BUN SERPL-MCNC: 7 MG/DL (ref 6–20)
BUN SERPL-MCNC: 9 MG/DL (ref 6–20)
BZE UR QL SCN: NEGATIVE
CALCIUM SERPL-MCNC: 8.2 MG/DL (ref 8.7–10.5)
CALCIUM SERPL-MCNC: 8.6 MG/DL (ref 8.7–10.5)
CANNABINOIDS UR QL SCN: NEGATIVE
CHLORIDE SERPL-SCNC: 108 MMOL/L (ref 95–110)
CHLORIDE SERPL-SCNC: 111 MMOL/L (ref 95–110)
CLARITY UR: CLEAR
CLARITY UR: CLEAR
CMV DNA SERPL NAA+PROBE-ACNC: NORMAL IU/ML
CO2 SERPL-SCNC: 19 MMOL/L (ref 23–29)
CO2 SERPL-SCNC: 21 MMOL/L (ref 23–29)
COLOR UR: ABNORMAL
COLOR UR: ABNORMAL
CREAT SERPL-MCNC: 0.9 MG/DL (ref 0.5–1.4)
CREAT SERPL-MCNC: 0.9 MG/DL (ref 0.5–1.4)
CREAT UR-MCNC: 200.8 MG/DL (ref 23–375)
DIFFERENTIAL METHOD: ABNORMAL
DIFFERENTIAL METHOD: ABNORMAL
EBV DNA BY PCR: NORMAL IU/ML
EOSINOPHIL # BLD AUTO: 0.2 K/UL (ref 0–0.5)
EOSINOPHIL # BLD AUTO: 0.3 K/UL (ref 0–0.5)
EOSINOPHIL NFR BLD: 1.9 % (ref 0–8)
EOSINOPHIL NFR BLD: 3.5 % (ref 0–8)
ERYTHROCYTE [DISTWIDTH] IN BLOOD BY AUTOMATED COUNT: 19.1 % (ref 11.5–14.5)
ERYTHROCYTE [DISTWIDTH] IN BLOOD BY AUTOMATED COUNT: 19.8 % (ref 11.5–14.5)
EST. GFR  (AFRICAN AMERICAN): >60 ML/MIN/1.73 M^2
EST. GFR  (AFRICAN AMERICAN): >60 ML/MIN/1.73 M^2
EST. GFR  (NON AFRICAN AMERICAN): >60 ML/MIN/1.73 M^2
EST. GFR  (NON AFRICAN AMERICAN): >60 ML/MIN/1.73 M^2
ETHANOL SERPL-MCNC: <10 MG/DL
GLUCOSE SERPL-MCNC: 86 MG/DL (ref 70–110)
GLUCOSE SERPL-MCNC: 97 MG/DL (ref 70–110)
GLUCOSE UR QL STRIP: NEGATIVE
GLUCOSE UR QL STRIP: NEGATIVE
HBV CORE AB SERPL QL IA: POSITIVE
HCT VFR BLD AUTO: 41.6 % (ref 40–54)
HCT VFR BLD AUTO: 43.4 % (ref 40–54)
HCV RNA SERPL NAA+PROBE-LOG IU: 6.62 LOG (10) IU/ML
HCV RNA SERPL QL NAA+PROBE: DETECTED IU/ML
HCV RNA SPEC NAA+PROBE-ACNC: ABNORMAL IU/ML
HGB BLD-MCNC: 14.2 G/DL (ref 14–18)
HGB BLD-MCNC: 14.6 G/DL (ref 14–18)
HGB UR QL STRIP: ABNORMAL
HGB UR QL STRIP: ABNORMAL
HSV-1 DNA BY PCR: NEGATIVE
HSV-2 DNA BY PCR: NEGATIVE
HYALINE CASTS #/AREA URNS LPF: 0 /LPF
IGG SERPL-MCNC: 1762 MG/DL (ref 650–1600)
IMM GRANULOCYTES # BLD AUTO: 0.04 K/UL (ref 0–0.04)
IMM GRANULOCYTES # BLD AUTO: 0.04 K/UL (ref 0–0.04)
IMM GRANULOCYTES NFR BLD AUTO: 0.5 % (ref 0–0.5)
IMM GRANULOCYTES NFR BLD AUTO: 0.5 % (ref 0–0.5)
INR PPP: 1.3 (ref 0.8–1.2)
INR PPP: 1.4 (ref 0.8–1.2)
KETONES UR QL STRIP: NEGATIVE
KETONES UR QL STRIP: NEGATIVE
LEUKOCYTE ESTERASE UR QL STRIP: NEGATIVE
LEUKOCYTE ESTERASE UR QL STRIP: NEGATIVE
LIPASE SERPL-CCNC: 22 U/L (ref 4–60)
LYMPHOCYTES # BLD AUTO: 2.2 K/UL (ref 1–4.8)
LYMPHOCYTES # BLD AUTO: 2.4 K/UL (ref 1–4.8)
LYMPHOCYTES NFR BLD: 27.6 % (ref 18–48)
LYMPHOCYTES NFR BLD: 29 % (ref 18–48)
MAGNESIUM SERPL-MCNC: 1.7 MG/DL (ref 1.6–2.6)
MCH RBC QN AUTO: 30.2 PG (ref 27–31)
MCH RBC QN AUTO: 30.6 PG (ref 27–31)
MCHC RBC AUTO-ENTMCNC: 33.6 G/DL (ref 32–36)
MCHC RBC AUTO-ENTMCNC: 34.1 G/DL (ref 32–36)
MCV RBC AUTO: 90 FL (ref 82–98)
MCV RBC AUTO: 90 FL (ref 82–98)
METHADONE UR QL SCN>300 NG/ML: NEGATIVE
MICROSCOPIC COMMENT: NORMAL
MONOCYTES # BLD AUTO: 0.8 K/UL (ref 0.3–1)
MONOCYTES # BLD AUTO: 1.1 K/UL (ref 0.3–1)
MONOCYTES NFR BLD: 10.4 % (ref 4–15)
MONOCYTES NFR BLD: 13.6 % (ref 4–15)
NEUTROPHILS # BLD AUTO: 4.4 K/UL (ref 1.8–7.7)
NEUTROPHILS # BLD AUTO: 4.7 K/UL (ref 1.8–7.7)
NEUTROPHILS NFR BLD: 53.2 % (ref 38–73)
NEUTROPHILS NFR BLD: 59.1 % (ref 38–73)
NITRITE UR QL STRIP: NEGATIVE
NITRITE UR QL STRIP: NEGATIVE
NRBC BLD-RTO: 0 /100 WBC
NRBC BLD-RTO: 0 /100 WBC
OPIATES UR QL SCN: NEGATIVE
PCP UR QL SCN>25 NG/ML: NEGATIVE
PH UR STRIP: 5 [PH] (ref 5–8)
PH UR STRIP: 5 [PH] (ref 5–8)
PHOSPHATE SERPL-MCNC: 2.2 MG/DL (ref 2.7–4.5)
PLATELET # BLD AUTO: 278 K/UL (ref 150–350)
PLATELET # BLD AUTO: 291 K/UL (ref 150–350)
PMV BLD AUTO: 11.3 FL (ref 9.2–12.9)
PMV BLD AUTO: 11.6 FL (ref 9.2–12.9)
POTASSIUM SERPL-SCNC: 3.8 MMOL/L (ref 3.5–5.1)
POTASSIUM SERPL-SCNC: 3.9 MMOL/L (ref 3.5–5.1)
PROT SERPL-MCNC: 6.5 G/DL (ref 6–8.4)
PROT SERPL-MCNC: 6.8 G/DL (ref 6–8.4)
PROT UR QL STRIP: ABNORMAL
PROT UR QL STRIP: ABNORMAL
PROTHROMBIN TIME: 13.6 SEC (ref 9–12.5)
PROTHROMBIN TIME: 13.8 SEC (ref 9–12.5)
RBC # BLD AUTO: 4.64 M/UL (ref 4.6–6.2)
RBC # BLD AUTO: 4.84 M/UL (ref 4.6–6.2)
RBC #/AREA URNS HPF: 4 /HPF (ref 0–4)
SODIUM SERPL-SCNC: 136 MMOL/L (ref 136–145)
SODIUM SERPL-SCNC: 141 MMOL/L (ref 136–145)
SP GR UR STRIP: 1.02 (ref 1–1.03)
SP GR UR STRIP: 1.02 (ref 1–1.03)
SQUAMOUS #/AREA URNS HPF: 3 /HPF
TOXICOLOGY INFORMATION: NORMAL
URN SPEC COLLECT METH UR: ABNORMAL
URN SPEC COLLECT METH UR: ABNORMAL
UROBILINOGEN UR STRIP-ACNC: ABNORMAL EU/DL
UROBILINOGEN UR STRIP-ACNC: ABNORMAL EU/DL
WBC # BLD AUTO: 7.96 K/UL (ref 3.9–12.7)
WBC # BLD AUTO: 8.22 K/UL (ref 3.9–12.7)
WBC #/AREA URNS HPF: 2 /HPF (ref 0–5)

## 2020-01-30 PROCEDURE — 80320 DRUG SCREEN QUANTALCOHOLS: CPT

## 2020-01-30 PROCEDURE — 82140 ASSAY OF AMMONIA: CPT

## 2020-01-30 PROCEDURE — 85025 COMPLETE CBC W/AUTO DIFF WBC: CPT

## 2020-01-30 PROCEDURE — 80053 COMPREHEN METABOLIC PANEL: CPT

## 2020-01-30 PROCEDURE — 80307 DRUG TEST PRSMV CHEM ANLYZR: CPT

## 2020-01-30 PROCEDURE — 85610 PROTHROMBIN TIME: CPT | Mod: 91

## 2020-01-30 PROCEDURE — 99232 PR SUBSEQUENT HOSPITAL CARE,LEVL II: ICD-10-PCS | Mod: ,,, | Performed by: INTERNAL MEDICINE

## 2020-01-30 PROCEDURE — 81000 URINALYSIS NONAUTO W/SCOPE: CPT | Mod: 59

## 2020-01-30 PROCEDURE — 99232 SBSQ HOSP IP/OBS MODERATE 35: CPT | Mod: ,,, | Performed by: INTERNAL MEDICINE

## 2020-01-30 PROCEDURE — 80053 COMPREHEN METABOLIC PANEL: CPT | Mod: 91

## 2020-01-30 PROCEDURE — 85025 COMPLETE CBC W/AUTO DIFF WBC: CPT | Mod: 91

## 2020-01-30 PROCEDURE — 83690 ASSAY OF LIPASE: CPT

## 2020-01-30 PROCEDURE — 84100 ASSAY OF PHOSPHORUS: CPT

## 2020-01-30 PROCEDURE — 36415 COLL VENOUS BLD VENIPUNCTURE: CPT

## 2020-01-30 PROCEDURE — 85610 PROTHROMBIN TIME: CPT

## 2020-01-30 PROCEDURE — 99285 EMERGENCY DEPT VISIT HI MDM: CPT

## 2020-01-30 PROCEDURE — 12000002 HC ACUTE/MED SURGE SEMI-PRIVATE ROOM

## 2020-01-30 PROCEDURE — 83735 ASSAY OF MAGNESIUM: CPT

## 2020-01-30 NOTE — NURSING
I advised the patient that he was to remain with nothing by mouth because he was to have a liver biopsy. The patient stated that he was hungry and was going to find some food. Dr. Cruz notified.

## 2020-01-30 NOTE — PLAN OF CARE
CM unable to complete the discharge planning assessment at this time. Patient is currently off of the floor and no family present at the bedside. Will re-attempt at a later time.    Luiza Miguel RN  Ext [phone]

## 2020-01-30 NOTE — PLAN OF CARE
Patient is lying in bed, resting quietly. No s/s of acute distress. Patient is NPO for a liver biopsy, but the patient stated that he is hungry and he's going to look for some food. I advised him that he is not supposed to eat before the test. Call light within reach. Bed locked and in low position.

## 2020-01-30 NOTE — NURSING
The patient was eating after being told that he was not supposed to eat. I notified ultrasound and they rescheduled the test for 1/31/20 at 0800. The patient  Then stated that he was going off the floor . I advised the patient that the doctor is going to be making rounds and he doesn't need to go far.

## 2020-01-30 NOTE — PLAN OF CARE
Plan of care reviewed with pt. AAOx4. Vitals were stable. US and echo was done today. Pt felt a little dizziness after the echo was done and physician was notified. He was able to walk around independently in the room. No complaints of any pain. Safety precautions were in placed.

## 2020-01-31 ENCOUNTER — TELEPHONE (OUTPATIENT)
Dept: TRANSPLANT | Facility: CLINIC | Age: 55
End: 2020-01-31

## 2020-01-31 LAB
ALBUMIN SERPL BCP-MCNC: 2.4 G/DL (ref 3.5–5.2)
ALP SERPL-CCNC: 173 U/L (ref 55–135)
ALT SERPL W/O P-5'-P-CCNC: 820 U/L (ref 10–44)
AMPHET+METHAMPHET UR QL: NORMAL
ANION GAP SERPL CALC-SCNC: 10 MMOL/L (ref 8–16)
AST SERPL-CCNC: 1519 U/L (ref 10–40)
BARBITURATES UR QL SCN>200 NG/ML: NEGATIVE
BASOPHILS # BLD AUTO: 0.05 K/UL (ref 0–0.2)
BASOPHILS NFR BLD: 0.6 % (ref 0–1.9)
BENZODIAZ UR QL SCN>200 NG/ML: NEGATIVE
BILIRUB SERPL-MCNC: 21.8 MG/DL (ref 0.1–1)
BUN SERPL-MCNC: 9 MG/DL (ref 6–20)
BZE UR QL SCN: NEGATIVE
CALCIUM SERPL-MCNC: 8.1 MG/DL (ref 8.7–10.5)
CANNABINOIDS UR QL SCN: NEGATIVE
CHLORIDE SERPL-SCNC: 108 MMOL/L (ref 95–110)
CO2 SERPL-SCNC: 20 MMOL/L (ref 23–29)
CREAT SERPL-MCNC: 0.9 MG/DL (ref 0.5–1.4)
CREAT UR-MCNC: 140 MG/DL (ref 23–375)
DIFFERENTIAL METHOD: ABNORMAL
EOSINOPHIL # BLD AUTO: 0.2 K/UL (ref 0–0.5)
EOSINOPHIL NFR BLD: 2.5 % (ref 0–8)
ERYTHROCYTE [DISTWIDTH] IN BLOOD BY AUTOMATED COUNT: 20.2 % (ref 11.5–14.5)
EST. GFR  (AFRICAN AMERICAN): >60 ML/MIN/1.73 M^2
EST. GFR  (NON AFRICAN AMERICAN): >60 ML/MIN/1.73 M^2
ETHANOL UR-MCNC: <10 MG/DL
GLUCOSE SERPL-MCNC: 78 MG/DL (ref 70–110)
HCT VFR BLD AUTO: 40.8 % (ref 40–54)
HGB BLD-MCNC: 13.5 G/DL (ref 14–18)
IMM GRANULOCYTES # BLD AUTO: 0.03 K/UL (ref 0–0.04)
IMM GRANULOCYTES NFR BLD AUTO: 0.3 % (ref 0–0.5)
INR PPP: 1.3 (ref 0.8–1.2)
LYMPHOCYTES # BLD AUTO: 2.8 K/UL (ref 1–4.8)
LYMPHOCYTES NFR BLD: 31.3 % (ref 18–48)
MAGNESIUM SERPL-MCNC: 1.8 MG/DL (ref 1.6–2.6)
MCH RBC QN AUTO: 30.1 PG (ref 27–31)
MCHC RBC AUTO-ENTMCNC: 33.1 G/DL (ref 32–36)
MCV RBC AUTO: 91 FL (ref 82–98)
METHADONE UR QL SCN>300 NG/ML: NEGATIVE
MONOCYTES # BLD AUTO: 1.2 K/UL (ref 0.3–1)
MONOCYTES NFR BLD: 13 % (ref 4–15)
NEUTROPHILS # BLD AUTO: 4.6 K/UL (ref 1.8–7.7)
NEUTROPHILS NFR BLD: 52.3 % (ref 38–73)
NRBC BLD-RTO: 0 /100 WBC
OPIATES UR QL SCN: NEGATIVE
PCP UR QL SCN>25 NG/ML: NEGATIVE
PHOSPHATE SERPL-MCNC: 2.7 MG/DL (ref 2.7–4.5)
PLATELET # BLD AUTO: 283 K/UL (ref 150–350)
PMV BLD AUTO: 11.8 FL (ref 9.2–12.9)
POTASSIUM SERPL-SCNC: 4 MMOL/L (ref 3.5–5.1)
PROT SERPL-MCNC: 6.3 G/DL (ref 6–8.4)
PROTHROMBIN TIME: 13.2 SEC (ref 9–12.5)
RBC # BLD AUTO: 4.48 M/UL (ref 4.6–6.2)
SMOOTH MUSCLE AB TITR SER IF: ABNORMAL {TITER}
SODIUM SERPL-SCNC: 138 MMOL/L (ref 136–145)
TOXICOLOGY INFORMATION: NORMAL
WBC # BLD AUTO: 8.82 K/UL (ref 3.9–12.7)

## 2020-01-31 PROCEDURE — 88342 IMHCHEM/IMCYTCHM 1ST ANTB: CPT | Performed by: PATHOLOGY

## 2020-01-31 PROCEDURE — 84100 ASSAY OF PHOSPHORUS: CPT

## 2020-01-31 PROCEDURE — 86592 SYPHILIS TEST NON-TREP QUAL: CPT

## 2020-01-31 PROCEDURE — 36415 COLL VENOUS BLD VENIPUNCTURE: CPT

## 2020-01-31 PROCEDURE — 80307 DRUG TEST PRSMV CHEM ANLYZR: CPT

## 2020-01-31 PROCEDURE — 88307 TISSUE EXAM BY PATHOLOGIST: CPT | Mod: 26,,, | Performed by: PATHOLOGY

## 2020-01-31 PROCEDURE — 88313 SPECIAL STAINS GROUP 2: CPT | Mod: 26,,, | Performed by: PATHOLOGY

## 2020-01-31 PROCEDURE — 25000003 PHARM REV CODE 250: Performed by: RADIOLOGY

## 2020-01-31 PROCEDURE — 99223 1ST HOSP IP/OBS HIGH 75: CPT | Mod: ,,, | Performed by: INTERNAL MEDICINE

## 2020-01-31 PROCEDURE — 88342 CHG IMMUNOCYTOCHEMISTRY: ICD-10-PCS | Mod: 26,,, | Performed by: PATHOLOGY

## 2020-01-31 PROCEDURE — 88313 SPECIAL STAINS GROUP 2: CPT | Mod: 59 | Performed by: PATHOLOGY

## 2020-01-31 PROCEDURE — 63600175 PHARM REV CODE 636 W HCPCS: Performed by: RADIOLOGY

## 2020-01-31 PROCEDURE — 83735 ASSAY OF MAGNESIUM: CPT

## 2020-01-31 PROCEDURE — 88342 IMHCHEM/IMCYTCHM 1ST ANTB: CPT | Mod: 26,,, | Performed by: PATHOLOGY

## 2020-01-31 PROCEDURE — 85610 PROTHROMBIN TIME: CPT

## 2020-01-31 PROCEDURE — 85025 COMPLETE CBC W/AUTO DIFF WBC: CPT

## 2020-01-31 PROCEDURE — 86780 TREPONEMA PALLIDUM: CPT

## 2020-01-31 PROCEDURE — 99223 PR INITIAL HOSPITAL CARE,LEVL III: ICD-10-PCS | Mod: ,,, | Performed by: INTERNAL MEDICINE

## 2020-01-31 PROCEDURE — 86593 SYPHILIS TEST NON-TREP QUANT: CPT

## 2020-01-31 PROCEDURE — 20600001 HC STEP DOWN PRIVATE ROOM

## 2020-01-31 PROCEDURE — 88307 TISSUE EXAM BY PATHOLOGIST: CPT | Performed by: PATHOLOGY

## 2020-01-31 PROCEDURE — 88307 PR  SURG PATH,LEVEL V: ICD-10-PCS | Mod: 26,,, | Performed by: PATHOLOGY

## 2020-01-31 PROCEDURE — 80053 COMPREHEN METABOLIC PANEL: CPT

## 2020-01-31 PROCEDURE — 88313 PR  SPECIAL STAINS,GROUP II: ICD-10-PCS | Mod: 26,,, | Performed by: PATHOLOGY

## 2020-01-31 PROCEDURE — 63600175 PHARM REV CODE 636 W HCPCS: Performed by: INTERNAL MEDICINE

## 2020-01-31 RX ORDER — LIDOCAINE HYDROCHLORIDE 10 MG/ML
INJECTION INFILTRATION; PERINEURAL CODE/TRAUMA/SEDATION MEDICATION
Status: COMPLETED | OUTPATIENT
Start: 2020-01-31 | End: 2020-01-31

## 2020-01-31 RX ORDER — FENTANYL CITRATE 50 UG/ML
INJECTION, SOLUTION INTRAMUSCULAR; INTRAVENOUS CODE/TRAUMA/SEDATION MEDICATION
Status: COMPLETED | OUTPATIENT
Start: 2020-01-31 | End: 2020-01-31

## 2020-01-31 RX ORDER — MIDAZOLAM HYDROCHLORIDE 1 MG/ML
INJECTION INTRAMUSCULAR; INTRAVENOUS CODE/TRAUMA/SEDATION MEDICATION
Status: COMPLETED | OUTPATIENT
Start: 2020-01-31 | End: 2020-01-31

## 2020-01-31 RX ORDER — TALC
6 POWDER (GRAM) TOPICAL NIGHTLY PRN
Status: DISCONTINUED | OUTPATIENT
Start: 2020-01-31 | End: 2020-02-03 | Stop reason: HOSPADM

## 2020-01-31 RX ORDER — ONDANSETRON 2 MG/ML
4 INJECTION INTRAMUSCULAR; INTRAVENOUS EVERY 6 HOURS PRN
Status: DISCONTINUED | OUTPATIENT
Start: 2020-01-31 | End: 2020-02-03 | Stop reason: HOSPADM

## 2020-01-31 RX ORDER — IPRATROPIUM BROMIDE AND ALBUTEROL SULFATE 2.5; .5 MG/3ML; MG/3ML
3 SOLUTION RESPIRATORY (INHALATION) EVERY 4 HOURS PRN
Status: DISCONTINUED | OUTPATIENT
Start: 2020-01-31 | End: 2020-02-03 | Stop reason: HOSPADM

## 2020-01-31 RX ADMIN — MIDAZOLAM HYDROCHLORIDE 0.5 MG: 1 INJECTION, SOLUTION INTRAMUSCULAR; INTRAVENOUS at 09:01

## 2020-01-31 RX ADMIN — ACETYLCYSTEINE 6.25 MG/KG/HR: 200 INJECTION, SOLUTION INTRAVENOUS at 03:01

## 2020-01-31 RX ADMIN — FENTANYL CITRATE 50 MCG: 50 INJECTION, SOLUTION INTRAMUSCULAR; INTRAVENOUS at 08:01

## 2020-01-31 RX ADMIN — LIDOCAINE HYDROCHLORIDE 9 ML: 10 INJECTION, SOLUTION INFILTRATION; PERINEURAL at 09:01

## 2020-01-31 RX ADMIN — FENTANYL CITRATE 25 MCG: 50 INJECTION, SOLUTION INTRAMUSCULAR; INTRAVENOUS at 09:01

## 2020-01-31 RX ADMIN — MIDAZOLAM HYDROCHLORIDE 1 MG: 1 INJECTION, SOLUTION INTRAMUSCULAR; INTRAVENOUS at 08:01

## 2020-01-31 NOTE — PLAN OF CARE
01/31/20 1603   Post-Acute Status   Post-Acute Authorization Other   Other Status No Post-Acute Service Needs   Discharge Delays None known at this time

## 2020-01-31 NOTE — PLAN OF CARE
Patient readmitted to [facility] after leaving [facility] yesterday and transferred back to [facility] via EMS this morning.  Patient is calm and cooperative but a poor historian with no reason as to why he eloped.   Initiated Acetylcysteine gtt and remains npo for possible liver biopsy, hepatology team consulted.  Orders placed for tele sitter for elopement monitoring and bed alarm armed. Vital signs stable.  Skin intact.  Due to void for collection of tox screen.  BMx1 upon arrival which was brown and loose. All personal belongings at bedside.  States that he will call his sister Courtney later this morning.

## 2020-01-31 NOTE — PLAN OF CARE
Liver bx completed, pt tolerated well. No apparent distress noted. Band aid applied CDI. Sand bag in place, remove at 10:10. Report called to primary nurse. Pt to be transferred to [facility], report to be given at bedside.

## 2020-01-31 NOTE — H&P
"Hospital Medicine  History and Physical Exam       Team: Networked reference to record PCT  Donn An MD  Admit Date: 1/30/2020  Principal Problem:  Acute hepatitis   Patient information was obtained from patient, past medical records and ER records  Primary care Physician: [facility]  Code status: Full Code    HPI:   Hal Bowie is a/an 54 y.o. male with history of drug use, presenting with acute liver failure and jaundice. He was seen 2 days ago and admitted and ended up eloping.  As per  note, "Patient is a 54 y.o. male who has a past medical history of Drug use presented with acute liver failure and jaundice. Work up in ED significant for acute liver failure with T. Bili 22, AST 1600,  and positive HCV antibody. US and CT abdomen unremarkable except fatty liver and contacted gallbladder. He endorses using IV methamphetamine last summer up until one month ago. Patient was admitted to [facility] on 1/28 and then eloped early this evening. Details of what happened are unclear. He then showed up to [facility] ED with IV in place and complaints of abdominal pain. It appears this is patients second episode of eloping. It is unclear if patient is leaving intentionally or due to confusion. Patient is awake and alert, answering questions appropriately but recollection of events do not make sense. "    On arrival here he did not have any complaints, and was oriented x4. He recalls the events of elopement, including wanting to visit his kids to tell them he was in the hospital, getting on a bus, and then losing his way. He recalls switching busses and asking to go to [facility]. He remembers talking to someone at [facility] about trying to return. He asked a  to go to [facility] which he ended up at [facility]. He otherwise had no new symptoms.       Past Medical History: Patient has a past medical history of Drug use.    Past Surgical History: Patient has a past surgical history that " includes Hernia repair and Back surgery.    Social History: Patient reports that he has been smoking. He has been smoking about 0.25 packs per day. He has never used smokeless tobacco. He reports that he drank alcohol. He reports that he has current or past drug history. Drug: Methamphetamines.    Family History: family history includes Cancer in his father; Hypertension in his mother.    Medications: reviewed     Allergies: Patient has No Known Allergies.    ROS  12 point ROS otherwise negative    PEx  Temp:  [96.7 °F (35.9 °C)-98.8 °F (37.1 °C)]   Pulse:  []   Resp:  [16-20]   BP: (116-147)/(67-91)   SpO2:  [97 %-100 %]   Body mass index is 26.35 kg/m².   No intake or output data in the 24 hours ending 01/31/20 0338    General appearance: no distress, laying on his side in bed  Mental status: Alert and oriented x 3  HEENT:  conjunctivae/corneas clear, PERRL  Neck: supple, thyroid not enlarged  Pulm:   normal respiratory effort, CTA B, no c/w/r  Card: RRR, S1, S2 normal, no murmur, click, rub or gallop  Abd: soft, mild distention; no masses, no organomegaly  Ext: no c/c; mild bilateral edema  Pulses: 2+, symmetric  Skin: color, texture, turgor normal. No rashes or lesions  Neuro: CN II-XII grossly intact, no focal numbness or weakness, normal strength and tone     Recent Results (from the past 24 hour(s))   CBC auto differential    Collection Time: 01/30/20  3:43 AM   Result Value Ref Range    WBC 8.22 3.90 - 12.70 K/uL    RBC 4.64 4.60 - 6.20 M/uL    Hemoglobin 14.2 14.0 - 18.0 g/dL    Hematocrit 41.6 40.0 - 54.0 %    Mean Corpuscular Volume 90 82 - 98 fL    Mean Corpuscular Hemoglobin 30.6 27.0 - 31.0 pg    Mean Corpuscular Hemoglobin Conc 34.1 32.0 - 36.0 g/dL    RDW 19.1 (H) 11.5 - 14.5 %    Platelets 278 150 - 350 K/uL    MPV 11.6 9.2 - 12.9 fL    Immature Granulocytes 0.5 0.0 - 0.5 %    Gran # (ANC) 4.4 1.8 - 7.7 K/uL    Immature Grans (Abs) 0.04 0.00 - 0.04 K/uL    Lymph # 2.4 1.0 - 4.8 K/uL    Mono #  1.1 (H) 0.3 - 1.0 K/uL    Eos # 0.3 0.0 - 0.5 K/uL    Baso # 0.02 0.00 - 0.20 K/uL    nRBC 0 0 /100 WBC    Gran% 53.2 38.0 - 73.0 %    Lymph% 29.0 18.0 - 48.0 %    Mono% 13.6 4.0 - 15.0 %    Eosinophil% 3.5 0.0 - 8.0 %    Basophil% 0.2 0.0 - 1.9 %    Differential Method Automated    Comprehensive metabolic panel    Collection Time: 01/30/20  3:43 AM   Result Value Ref Range    Sodium 136 136 - 145 mmol/L    Potassium 3.9 3.5 - 5.1 mmol/L    Chloride 108 95 - 110 mmol/L    CO2 19 (L) 23 - 29 mmol/L    Glucose 86 70 - 110 mg/dL    BUN, Bld 7 6 - 20 mg/dL    Creatinine 0.9 0.5 - 1.4 mg/dL    Calcium 8.2 (L) 8.7 - 10.5 mg/dL    Total Protein 6.5 6.0 - 8.4 g/dL    Albumin 2.5 (L) 3.5 - 5.2 g/dL    Total Bilirubin 22.0 (H) 0.1 - 1.0 mg/dL    Alkaline Phosphatase 186 (H) 55 - 135 U/L    AST 1,601 (H) 10 - 40 U/L     (H) 10 - 44 U/L    Anion Gap 9 8 - 16 mmol/L    eGFR if African American >60.0 >60 mL/min/1.73 m^2    eGFR if non African American >60.0 >60 mL/min/1.73 m^2   Magnesium    Collection Time: 01/30/20  3:43 AM   Result Value Ref Range    Magnesium 1.7 1.6 - 2.6 mg/dL   Phosphorus    Collection Time: 01/30/20  3:43 AM   Result Value Ref Range    Phosphorus 2.2 (L) 2.7 - 4.5 mg/dL   Protime-INR    Collection Time: 01/30/20  3:43 AM   Result Value Ref Range    Prothrombin Time 13.8 (H) 9.0 - 12.5 sec    INR 1.4 (H) 0.8 - 1.2   Urinalysis, Reflex to Urine Culture Urine, Clean Catch    Collection Time: 01/30/20  7:49 PM   Result Value Ref Range    Specimen UA Urine, Clean Catch     Color, UA Bella Yellow, Straw, Bella    Appearance, UA Clear Clear    pH, UA 5.0 5.0 - 8.0    Specific Gravity, UA 1.020 1.005 - 1.030    Protein, UA 1+ (A) Negative    Glucose, UA Negative Negative    Ketones, UA Negative Negative    Bilirubin (UA) 2+ (A) Negative    Occult Blood UA 1+ (A) Negative    Nitrite, UA Negative Negative    Urobilinogen, UA 4.0-6.0 (A) <2.0 EU/dL    Leukocytes, UA Negative Negative   Urinalysis, Reflex to  Urine Culture Urine, Clean Catch    Collection Time: 01/30/20  7:49 PM   Result Value Ref Range    Specimen UA Urine, Clean Catch     Color, UA Bella Yellow, Straw, Bella    Appearance, UA Clear Clear    pH, UA 5.0 5.0 - 8.0    Specific Gravity, UA 1.020 1.005 - 1.030    Protein, UA 1+ (A) Negative    Glucose, UA Negative Negative    Ketones, UA Negative Negative    Bilirubin (UA) 2+ (A) Negative    Occult Blood UA 1+ (A) Negative    Nitrite, UA Negative Negative    Urobilinogen, UA 4.0-6.0 (A) <2.0 EU/dL    Leukocytes, UA Negative Negative   Drug screen panel, emergency    Collection Time: 01/30/20  7:49 PM   Result Value Ref Range    Benzodiazepines Negative     Methadone metabolites Negative     Cocaine (Metab.) Negative     Opiate Scrn, Ur Negative     Barbiturate Screen, Ur Negative     Amphetamine Screen, Ur Presumptive Positive     THC Negative     Phencyclidine Negative     Creatinine, Random Ur 200.8 23.0 - 375.0 mg/dL    Toxicology Information SEE COMMENT    Urinalysis Microscopic    Collection Time: 01/30/20  7:49 PM   Result Value Ref Range    RBC, UA 4 0 - 4 /hpf    WBC, UA 2 0 - 5 /hpf    Bacteria Occasional None-Occ /hpf    Squam Epithel, UA 3 /hpf    Hyaline Casts, UA 0 0-1/lpf /lpf    Microscopic Comment SEE COMMENT    CBC W/ AUTO DIFFERENTIAL    Collection Time: 01/30/20  7:50 PM   Result Value Ref Range    WBC 7.96 3.90 - 12.70 K/uL    RBC 4.84 4.60 - 6.20 M/uL    Hemoglobin 14.6 14.0 - 18.0 g/dL    Hematocrit 43.4 40.0 - 54.0 %    Mean Corpuscular Volume 90 82 - 98 fL    Mean Corpuscular Hemoglobin 30.2 27.0 - 31.0 pg    Mean Corpuscular Hemoglobin Conc 33.6 32.0 - 36.0 g/dL    RDW 19.8 (H) 11.5 - 14.5 %    Platelets 291 150 - 350 K/uL    MPV 11.3 9.2 - 12.9 fL    Immature Granulocytes 0.5 0.0 - 0.5 %    Gran # (ANC) 4.7 1.8 - 7.7 K/uL    Immature Grans (Abs) 0.04 0.00 - 0.04 K/uL    Lymph # 2.2 1.0 - 4.8 K/uL    Mono # 0.8 0.3 - 1.0 K/uL    Eos # 0.2 0.0 - 0.5 K/uL    Baso # 0.04 0.00 - 0.20  K/uL    nRBC 0 0 /100 WBC    Gran% 59.1 38.0 - 73.0 %    Lymph% 27.6 18.0 - 48.0 %    Mono% 10.4 4.0 - 15.0 %    Eosinophil% 1.9 0.0 - 8.0 %    Basophil% 0.5 0.0 - 1.9 %    Differential Method Automated    Comp. Metabolic Panel    Collection Time: 01/30/20  7:50 PM   Result Value Ref Range    Sodium 141 136 - 145 mmol/L    Potassium 3.8 3.5 - 5.1 mmol/L    Chloride 111 (H) 95 - 110 mmol/L    CO2 21 (L) 23 - 29 mmol/L    Glucose 97 70 - 110 mg/dL    BUN, Bld 9 6 - 20 mg/dL    Creatinine 0.9 0.5 - 1.4 mg/dL    Calcium 8.6 (L) 8.7 - 10.5 mg/dL    Total Protein 6.8 6.0 - 8.4 g/dL    Albumin 2.7 (L) 3.5 - 5.2 g/dL    Total Bilirubin 24.3 (H) 0.1 - 1.0 mg/dL    Alkaline Phosphatase 182 (H) 55 - 135 U/L    AST 1,625 (H) 10 - 40 U/L     (H) 10 - 44 U/L    Anion Gap 9 8 - 16 mmol/L    eGFR if African American >60 >60 mL/min/1.73 m^2    eGFR if non African American >60 >60 mL/min/1.73 m^2   Lipase    Collection Time: 01/30/20  7:50 PM   Result Value Ref Range    Lipase 22 4 - 60 U/L   Protime-INR    Collection Time: 01/30/20  7:50 PM   Result Value Ref Range    Prothrombin Time 13.6 (H) 9.0 - 12.5 sec    INR 1.3 (H) 0.8 - 1.2   Ammonia    Collection Time: 01/30/20  7:50 PM   Result Value Ref Range    Ammonia 57 (H) 10 - 50 umol/L   Ethanol    Collection Time: 01/30/20  7:50 PM   Result Value Ref Range    Alcohol, Medical, Serum <10 <10 mg/dL       Active Hospital Problems    Diagnosis  POA    *Acute hepatitis [B17.9]  Yes    Liver failure without hepatic coma [K72.90]  Yes    Gastroesophageal reflux disease without esophagitis [K21.9]  Yes    Jaundice [R17]  Yes    Tobacco abuse [Z72.0]  Yes    Chronic hepatitis C without hepatic coma [B18.2]  Yes      Resolved Hospital Problems   No resolved problems to display.     Assessment and Plan:  Acute hepatitis  Chronic hep C  MELD-Na score: 21 at 1/30/2020  7:50 PM  MELD score: 21 at 1/30/2020  7:50 PM  Calculated from:  Serum Creatinine: 0.9 mg/dL (Rounded to 1 mg/dL)  at 1/30/2020  7:50 PM  Serum Sodium: 141 mmol/L (Rounded to 137 mmol/L) at 1/30/2020  7:50 PM  Total Bilirubin: 24.3 mg/dL at 1/30/2020  7:50 PM  INR(ratio): 1.3 at 1/30/2020  7:50 PM  Age: 54 years  As per previous documentation:  Unclear etiologDictation #1  MRN:[id number]  CSN:[id number]  y, chronic hep C diagnosis which had not been treat, Hep A IGG was positive but negative IgM.   Autoimmune marker pneding  Meth use  Continue to monitor LFTs and INR  Continue NAC infusion  Hepatology consulted again    GERD- protonix    DVT PPx: SCDs    Donn An MD  Hospital Medicine Staff  01/31/2020

## 2020-01-31 NOTE — PROGRESS NOTES
Pt arrived to [facility] via stretcher on RA, pt drowsy but responds to voice, sandbag over site for 1hr, then back to bed

## 2020-01-31 NOTE — ED TRIAGE NOTES
Pt c/o abd pain , with dark red stools, pt is confused, sclera are yellow, resp even and unlabored,

## 2020-01-31 NOTE — PLAN OF CARE
(Physician in Lead of Transfers)   Outside Transfer Acceptance Note / Regional Referral Center    Upon patient arrival to floor, please call extension [phone] (if no answer, this will flip to a beeper, so enter your call back number) for Hospital Medicine admit team assignment and for additional admit orders for the patient.  Do not page the attending physician associated with the patient on arrival (this physician may not be on duty at the time of arrival).  Rather, always call [phone] to reach the triage physician for orders and team assignment.    Transferring Physician: Bhaskar Chang    Accepting Physician: Jael Aviles MD    Date of Acceptance: 01/30/2020    Transferring Facility: [facility]     Reason for Transfer: acute liver failure.     Report from Transferring Physician/Hospital course:  Patient is a 54 y.o. male who has a past medical history of Drug use presented with acute liver failure and jaundice. Work up in ED significant for acute liver failure with T. Bili 22, AST 1600,  and positive HCV antibody. US and CT abdomen unremarkable except fatty liver and contacted gallbladder. He endorses using IV methamphetamine last summer up until one month ago. Patient was admitted to IM on 1/28 and then eloped early this evening. Details of what happened are unclear. He then showed up to [facility] ED with IV in place and complaints of abdominal pain. It appears this is patients second episode of eloping. It is unclear if patient is leaving intentionally or due to confusion. Patient is awake and alert, answering questions appropriately but recollection of events do not make sense.       Vital Signs (Most Recent):  Temp: 97.6 °F (36.4 °C) (01/30/20 1131)  Pulse: 68 (01/30/20 1131)  Resp: 18 (01/30/20 1131)  BP: 116/72 (01/30/20 1131)  SpO2: 97 % (01/30/20 1131) Vital Signs (24h Range):  Temp:  [97.6 °F (36.4 °C)-98.8 °F (37.1 °C)] 98.8 °F (37.1 °C)  Pulse:  [] 100  Resp:  [16-18] 16  SpO2:   [97 %-98 %] 98 %  BP: (116-147)/(72-91) 147/91       Labs & Radiographs: see EPIC    Significant Labs:     MELD-Na score: 23 at 1/30/2020  3:43 AM  MELD score: 22 at 1/30/2020  3:43 AM  Calculated from:  Serum Creatinine: 0.9 mg/dL (Rounded to 1 mg/dL) at 1/30/2020  3:43 AM  Serum Sodium: 136 mmol/L at 1/30/2020  3:43 AM  Total Bilirubin: 22.0 mg/dL at 1/30/2020  3:43 AM  INR(ratio): 1.4 at 1/30/2020  3:43 AM  Age: 54 years    CMP:   Recent Labs   Lab 01/29/20 0224 01/29/20  1544 01/30/20  0343    135* 136   K 3.9 4.2 3.9    105 108   CO2 27 18* 19*   GLU 92 100 86   BUN 12 9 7   CREATININE 1.1 0.9 0.9   CALCIUM 9.2 8.3* 8.2*   PROT 8.0 6.7 6.5   ALBUMIN 3.4* 2.7* 2.5*   BILITOT 26.9* 22.5* 22.0*   ALKPHOS 229* 175* 186*   AST 2,052* 1,633* 1,601*   ALT 1,073* 875* 853*   ANIONGAP 8 12 9   ESTGFRAFRICA >60.0 >60.0 >60.0   EGFRNONAA >60.0 >60.0 >60.0   , CBC:   Recent Labs   Lab 01/29/20 0224 01/30/20  0343   WBC 9.53 8.22   HGB 14.6 14.2   HCT 46.0 41.6    278   , INR:   Recent Labs   Lab 01/29/20 0224 01/29/20  1544 01/30/20  0343   INR 1.4* 1.5* 1.4*    and Troponin No results for input(s): TROPONINI in the last 48 hours.      To Do List:   1. Admit to   2. Cont supportive care  3. NPO in case liver biopsy needed  4. Consult hepatology.       Upon patient arrival to floor, please call extension [phone] (if no answer, this will flip to a beeper, so enter your call back number) for Hospital Medicine admit team assignment and for additional admit orders for the patient.  Do not page the attending physician associated with the patient on arrival (this physician may not be on duty at the time of arrival).  Rather, always call [phone] to reach the triage physician for orders and team assignment.      Jael Aviles MD  Hospital Medicine Staff

## 2020-01-31 NOTE — H&P
Inpatient Radiology Pre-procedure Note    History of Present Illness:  Hal Bowie is a 54 y.o. male who presents for US guided percutaneous liver biopsy.  Admission H&P reviewed.  Past Medical History:   Diagnosis Date    Drug use      Past Surgical History:   Procedure Laterality Date    BACK SURGERY      HERNIA REPAIR         Review of Systems:   As documented in primary team H&P    Home Meds:   Prior to Admission medications    Not on File     Scheduled Meds:   Continuous Infusions:    acetylcysteine (ACETADOTE) infusion *optional fourth dose* 6.25 mg/kg/hr (01/31/20 0325)     PRN Meds:albuterol-ipratropium, melatonin, ondansetron  Anticoagulants/Antiplatelets: no anticoagulation    Allergies: Review of patient's allergies indicates:  No Known Allergies  Sedation Hx: have not been any systemic reactions    Labs:  Recent Labs   Lab 01/31/20 0423   INR 1.3*       Recent Labs   Lab 01/31/20 0423   WBC 8.82   HGB 13.5*   HCT 40.8   MCV 91         Recent Labs   Lab 01/31/20 0423   GLU 78      K 4.0      CO2 20*   BUN 9   CREATININE 0.9   CALCIUM 8.1*   MG 1.8   *   AST 1,519*   ALBUMIN 2.4*   BILITOT 21.8*         Vitals:  Temp: 99.6 °F (37.6 °C) (01/31/20 0715)  Pulse: 69 (01/31/20 0715)  Resp: 14 (01/31/20 0715)  BP: 113/69 (01/31/20 0715)  SpO2: 97 % (01/31/20 0715)     Physical Exam:  ASA: 3  Mallampati: 2    General: no acute distress  Mental Status: alert and oriented to person, place and time  HEENT: normocephalic, atraumatic  Chest: unlabored breathing  Abdomen: nondistended  Extremity: moves all extremities    Plan: US guided liver biopsy  Sedation Plan: Kushal Baeza MD  Resident  Department of Radiology  Pager: [phone]

## 2020-01-31 NOTE — PLAN OF CARE
Pt arrived to US room 8 for liver bx, no acute distress noted. Orders and labs reviewed on chart. Awaiting consent.

## 2020-01-31 NOTE — NURSING
The patient called back to the hospital and stated that he was lost. He stated that he had took a bus to meet his daughter and he was on the [employer]. Patient was advised that he was discharged.

## 2020-01-31 NOTE — PLAN OF CARE
01/31/20 1601   Discharge Assessment   Assessment Type Discharge Planning Assessment   Confirmed/corrected address and phone number on facesheet? Yes   Assessment information obtained from? Patient   Expected Length of Stay (days) 3   Communicated expected length of stay with patient/caregiver yes   Prior to hospitilization cognitive status: Alert/Oriented   Prior to hospitalization functional status: Independent   Current cognitive status: Alert/Oriented   Current Functional Status: Independent   Lives With other (see comments)  (Room mate)   Able to Return to Prior Arrangements yes   Is patient able to care for self after discharge? Yes   Who are your caregiver(s) and their phone number(s)? Ayana Dale (sisiter) [phone]   Patient's perception of discharge disposition home or selfcare   Readmission Within the Last 30 Days no previous admission in last 30 days   Patient currently being followed by outpatient case management? No   Patient currently receives any other outside agency services? No   Equipment Currently Used at Home none   Do you have any problems affording any of your prescribed medications? TBD   Does the patient receive services at the [facility]madin Clinic? No   Discharge Plan A Home   DME Needed Upon Discharge  none   Patient/Family in Agreement with Plan yes

## 2020-01-31 NOTE — PROCEDURES
Radiology Post-Procedure Note    Pre Op Diagnosis: Abnormal LFTs    Post Op Diagnosis: Same    Procedure: Ultrasound guided liver biopsy    Procedure performed by: Cesilia Nunez MD    Written Informed Consent Obtained: Yes    Specimen Removed: Yes: Two 16 gauge core biopsies of liver    Estimated Blood Loss: Minimal    Findings: Moderate sedation and local anesthesia were used.    A 16-gauge Monopty biopsy device was used to remove 2 random hepatic specimens.  This specimen was sent to pathology for further evaluation.      The patient tolerated the procedure well and there were no complications.  Please see Imaging report for further details.      Haleigh Baeza MD  Resident  Department of Radiology  Pager: [phone]

## 2020-01-31 NOTE — ED NOTES
"Pt came in for triage and told me he came from [facility] with IV in place. I removed IV from pt. And explained that pt can't leave hospital with IV in arm. Pt states," I want to be triaged so your ER will send me back to [facility]."  "

## 2020-01-31 NOTE — NURSING
Sandbag removed and band aid CDI, no bleeding, no hematoma noted, pt transported back to bed via stretcher on RA by escort in NAD

## 2020-01-31 NOTE — ED PROVIDER NOTES
"Encounter Date: 1/30/2020    This is a Provider in Triage/MSE of a 54 y.o. male presenting to the ED with c/o abdominal pain - was admitted at [facility] following transfer from [facility]. History of drug abuse. Appears patient eloped from the hospital at [facility]. Was being evaluated for ESLD. Care will be transferred to an alternate provider when patient is roomed for a full evaluation and final disposition. BEATRIS Cintron, FNP-C 1/30/2020 7:02 PM    SCRIBE #1 NOTE: I, Bhaskar Chang, am scribing for, and in the presence of,  Rafia Hooker MD. I have scribed the following portions of the note - Other sections scribed: HPI, ROS.       History     Chief Complaint   Patient presents with    Abdominal Pain     Pt states," I was at [facility] and left on a bus.  I need to check back in and get admit so i can go back to [facility] for my Sx."     This is a 54 y.o. male who presents to the ED with a complaint of needing to get back to [facility] where he was recently admitted.  Patient patient reports he was admitted yesterday at [facility] for evaluation abdominal pain and "problems with his liver"  he reports he left the facility earlier left the facility to meet his daughter, ended up lost and got on a bus that took the patient to the [facility]. Currently patient is complaining of right-sided abdominal pain. He reports he had red blood in his stool prior to admission which had improved but have since resumed. He states that his urine is darker and "whiskey"-colored. He denies any fall or injury.         Review of patient's allergies indicates:  No Known Allergies  Past Medical History:   Diagnosis Date    Drug use      Past Surgical History:   Procedure Laterality Date    BACK SURGERY      HERNIA REPAIR       Family History   Problem Relation Age of Onset    Hypertension Mother     Cancer Father      Social History     Tobacco Use    Smoking status: Current Every Day Smoker     Packs/day: 0.25    Smokeless " "tobacco: Never Used   Substance Use Topics    Alcohol use: Not Currently    Drug use: Not Currently     Types: Methamphetamines     Comment: "ended around December 2019"     Review of Systems   Constitutional: Negative for fever.   HENT: Negative for sore throat.    Respiratory: Negative for shortness of breath.    Cardiovascular: Negative for chest pain.   Gastrointestinal: Positive for abdominal pain (right-sided abdominal pain) and blood in stool.   Genitourinary: Negative for dysuria.        Positive for darker colored urine.    Musculoskeletal: Negative for back pain.   Skin: Negative for rash.   Neurological: Negative for weakness.   Hematological: Does not bruise/bleed easily.       Physical Exam     Initial Vitals [01/30/20 1857]   BP Pulse Resp Temp SpO2   (!) 147/91 100 16 98.8 °F (37.1 °C) 98 %      MAP       --         Physical Exam    Nursing note and vitals reviewed.  Constitutional: He is not diaphoretic. No distress.   HENT:   Head: Normocephalic and atraumatic.   Mouth/Throat: Oropharynx is clear and moist.   Eyes: Conjunctivae and EOM are normal. Pupils are equal, round, and reactive to light.   Scleral icterus present.    Neck: Normal range of motion. Neck supple. No JVD present.   Cardiovascular: Normal rate, regular rhythm and intact distal pulses.   Pulmonary/Chest: Breath sounds normal. No stridor. No respiratory distress.   Abdominal: Soft. Bowel sounds are normal. He exhibits no distension.   Generalized abdominal tenderness. No rebound or guarding.    Musculoskeletal: Normal range of motion. He exhibits no edema or tenderness.   No asterixis   Neurological: He is alert and oriented to person, place, and time. He has normal strength. No cranial nerve deficit or sensory deficit. GCS score is 15. GCS eye subscore is 4. GCS verbal subscore is 5. GCS motor subscore is 6.   Skin: Skin is warm and dry. No rash noted.   Psychiatric: He has a normal mood and affect.         ED Course "   Procedures  Labs Reviewed   CBC W/ AUTO DIFFERENTIAL - Abnormal; Notable for the following components:       Result Value    RDW 19.8 (*)     All other components within normal limits   COMPREHENSIVE METABOLIC PANEL - Abnormal; Notable for the following components:    Chloride 111 (*)     CO2 21 (*)     Calcium 8.6 (*)     Albumin 2.7 (*)     Total Bilirubin 24.3 (*)     Alkaline Phosphatase 182 (*)     AST 1,625 (*)      (*)     All other components within normal limits   URINALYSIS, REFLEX TO URINE CULTURE - Abnormal; Notable for the following components:    Protein, UA 1+ (*)     Bilirubin (UA) 2+ (*)     Occult Blood UA 1+ (*)     Urobilinogen, UA 4.0-6.0 (*)     All other components within normal limits    Narrative:     Preferred Collection Type->Urine, Clean Catch   PROTIME-INR - Abnormal; Notable for the following components:    Prothrombin Time 13.6 (*)     INR 1.3 (*)     All other components within normal limits   AMMONIA - Abnormal; Notable for the following components:    Ammonia 57 (*)     All other components within normal limits   URINALYSIS, REFLEX TO URINE CULTURE - Abnormal; Notable for the following components:    Protein, UA 1+ (*)     Bilirubin (UA) 2+ (*)     Occult Blood UA 1+ (*)     Urobilinogen, UA 4.0-6.0 (*)     All other components within normal limits    Narrative:     Preferred Collection Type->Urine, Clean Catch   LIPASE   DRUG SCREEN PANEL, URINE EMERGENCY    Narrative:     Preferred Collection Type->Urine, Clean Catch   ALCOHOL,MEDICAL (ETHANOL)   URINALYSIS MICROSCOPIC    Narrative:     Preferred Collection Type->Urine, Clean Catch          Imaging Results    None          Medical Decision Making:   History:   Old Medical Records: I decided to obtain old medical records.  Old Records Summarized: records from previous admission(s).       <> Summary of Records: Recently admitted for liver failure and hyperbilirubinemia.    Initial Assessment:   Patient is afebrile and in no  acute distress at time history and physical.  He has a GCS of 15 and is alert and oriented. He has no evidence of head trauma.  He has no asterixis.  He is answering questions appropriately.  Will obtain lab testing and consult previously admitting service  Differential Diagnosis:   Includes but not limited to: Hepatic encephalopathy, renal failure, intoxication, electrolyte abnormalities  Clinical Tests:   Lab Tests: Ordered and Reviewed  ED Management:  Labs without leukocytosis or granulocytosis to suggest significant infectious process.  Hemoglobin is within normal ranges.  Patient has transaminitis and hyperbilirubinemia as well as mild elevation and pneumonia from that is comparable to prior.  He remains stable in the emergency department.    Case discussed with Dr. Aviles from [facility] who has accepted patient for transfer back to his admitting service for further evaluation and management of his liver failure.  This chart was completed using dictation software, as a result there may be some transcription errors.             Scribe Attestation:   Scribe #1: I performed the above scribed service and the documentation accurately describes the services I performed. I attest to the accuracy of the note.                          Clinical Impression:       ICD-10-CM ICD-9-CM   1. Liver failure without hepatic coma, unspecified chronicity K72.90 572.8         Disposition:   Disposition: Transferred  Condition: Stable                 I, Rafia Hooker , personally performed the services described in this documentation. All medical record entries made by the scribe were at my direction and in my presence.  I have reviewed the chart and agree that the record reflects my personal performance and is accurate and complete.       Rafia Hooker MD  01/30/20 2314

## 2020-02-01 PROBLEM — F15.10 METHAMPHETAMINE ABUSE: Status: ACTIVE | Noted: 2020-02-01

## 2020-02-01 LAB
ALBUMIN SERPL BCP-MCNC: 2.4 G/DL (ref 3.5–5.2)
ALP SERPL-CCNC: 166 U/L (ref 55–135)
ALT SERPL W/O P-5'-P-CCNC: 698 U/L (ref 10–44)
ANA SER QL IF: NORMAL
ANION GAP SERPL CALC-SCNC: 8 MMOL/L (ref 8–16)
AST SERPL-CCNC: 1166 U/L (ref 10–40)
BASOPHILS # BLD AUTO: 0.03 K/UL (ref 0–0.2)
BASOPHILS NFR BLD: 0.4 % (ref 0–1.9)
BILIRUB SERPL-MCNC: 20.6 MG/DL (ref 0.1–1)
BUN SERPL-MCNC: 9 MG/DL (ref 6–20)
CALCIUM SERPL-MCNC: 8.2 MG/DL (ref 8.7–10.5)
CHLORIDE SERPL-SCNC: 107 MMOL/L (ref 95–110)
CO2 SERPL-SCNC: 19 MMOL/L (ref 23–29)
CREAT SERPL-MCNC: 1 MG/DL (ref 0.5–1.4)
DIFFERENTIAL METHOD: ABNORMAL
EOSINOPHIL # BLD AUTO: 0.2 K/UL (ref 0–0.5)
EOSINOPHIL NFR BLD: 2.9 % (ref 0–8)
ERYTHROCYTE [DISTWIDTH] IN BLOOD BY AUTOMATED COUNT: 20.7 % (ref 11.5–14.5)
EST. GFR  (AFRICAN AMERICAN): >60 ML/MIN/1.73 M^2
EST. GFR  (NON AFRICAN AMERICAN): >60 ML/MIN/1.73 M^2
GLUCOSE SERPL-MCNC: 98 MG/DL (ref 70–110)
HCT VFR BLD AUTO: 40.5 % (ref 40–54)
HGB BLD-MCNC: 13.3 G/DL (ref 14–18)
IMM GRANULOCYTES # BLD AUTO: 0.06 K/UL (ref 0–0.04)
IMM GRANULOCYTES NFR BLD AUTO: 0.8 % (ref 0–0.5)
INR PPP: 1.5 (ref 0.8–1.2)
LYMPHOCYTES # BLD AUTO: 2.7 K/UL (ref 1–4.8)
LYMPHOCYTES NFR BLD: 33.5 % (ref 18–48)
MAGNESIUM SERPL-MCNC: 1.7 MG/DL (ref 1.6–2.6)
MCH RBC QN AUTO: 29.8 PG (ref 27–31)
MCHC RBC AUTO-ENTMCNC: 32.8 G/DL (ref 32–36)
MCV RBC AUTO: 91 FL (ref 82–98)
MONOCYTES # BLD AUTO: 1 K/UL (ref 0.3–1)
MONOCYTES NFR BLD: 12.3 % (ref 4–15)
NEUTROPHILS # BLD AUTO: 4 K/UL (ref 1.8–7.7)
NEUTROPHILS NFR BLD: 50.1 % (ref 38–73)
NRBC BLD-RTO: 0 /100 WBC
PHOSPHATE SERPL-MCNC: 3.2 MG/DL (ref 2.7–4.5)
PLATELET # BLD AUTO: 287 K/UL (ref 150–350)
PMV BLD AUTO: 11.7 FL (ref 9.2–12.9)
POTASSIUM SERPL-SCNC: 3.9 MMOL/L (ref 3.5–5.1)
PROT SERPL-MCNC: 6.4 G/DL (ref 6–8.4)
PROTHROMBIN TIME: 14.4 SEC (ref 9–12.5)
RBC # BLD AUTO: 4.46 M/UL (ref 4.6–6.2)
SODIUM SERPL-SCNC: 134 MMOL/L (ref 136–145)
WBC # BLD AUTO: 8 K/UL (ref 3.9–12.7)

## 2020-02-01 PROCEDURE — 99233 PR SUBSEQUENT HOSPITAL CARE,LEVL III: ICD-10-PCS | Mod: ,,, | Performed by: HOSPITALIST

## 2020-02-01 PROCEDURE — 84100 ASSAY OF PHOSPHORUS: CPT

## 2020-02-01 PROCEDURE — 20600001 HC STEP DOWN PRIVATE ROOM

## 2020-02-01 PROCEDURE — 83735 ASSAY OF MAGNESIUM: CPT

## 2020-02-01 PROCEDURE — 63600175 PHARM REV CODE 636 W HCPCS: Performed by: INTERNAL MEDICINE

## 2020-02-01 PROCEDURE — 80053 COMPREHEN METABOLIC PANEL: CPT

## 2020-02-01 PROCEDURE — 85610 PROTHROMBIN TIME: CPT

## 2020-02-01 PROCEDURE — 85025 COMPLETE CBC W/AUTO DIFF WBC: CPT

## 2020-02-01 PROCEDURE — 99233 SBSQ HOSP IP/OBS HIGH 50: CPT | Mod: ,,, | Performed by: HOSPITALIST

## 2020-02-01 PROCEDURE — 36415 COLL VENOUS BLD VENIPUNCTURE: CPT

## 2020-02-01 RX ORDER — OXYCODONE HYDROCHLORIDE 5 MG/1
5 TABLET ORAL ONCE AS NEEDED
Status: COMPLETED | OUTPATIENT
Start: 2020-02-02 | End: 2020-02-01

## 2020-02-01 RX ADMIN — ACETYLCYSTEINE 6.25 MG/KG/HR: 200 INJECTION, SOLUTION INTRAVENOUS at 06:02

## 2020-02-01 RX ADMIN — OXYCODONE HYDROCHLORIDE 5 MG: 5 TABLET ORAL at 11:02

## 2020-02-01 NOTE — DISCHARGE SUMMARY
"Discharge Summary  Hospital Medicine    Patient Name: Hal Bowie  MRN:  [id number]  Hospital Medicine Team: [facility] HOSP MED L Kolby Cruz MD  Date of Admission:  1/29/2020     Date of Discharge:  01/31/2020  Length of Stay:  LOS: 1 day   Principal Problem:  Acute hepatitis     Active Hospital Problems    Diagnosis  POA    *Acute hepatitis [B17.9]  Yes    Liver failure without hepatic coma [K72.90]  Yes    Gastroesophageal reflux disease without esophagitis [K21.9]  Yes    Jaundice [R17]  Yes    Tobacco abuse [Z72.0]  Yes    Chronic hepatitis C without hepatic coma [B18.2]  Yes      Resolved Hospital Problems   No resolved problems to display.       History of Present Illness:      Hal Bowie is a/an 54 y.o. male with history of drug use, presenting with acute liver failure and jaundice. He was seen 2 days ago and admitted and ended up eloping.  As per  note, "Patient is a 54 y.o. male who has a past medical history of Drug use presented with acute liver failure and jaundice. Work up in ED significant for acute liver failure with T. Bili 22, AST 1600, ALT 853 and positive HCV antibody. US and CT abdomen unremarkable except fatty liver and contacted gallbladder. He endorses using IV methamphetamine last summer up until one month ago. Patient was admitted to [facility] on 1/28 and then eloped early this evening. Details of what happened are unclear. He then showed up to [facility] ED with IV in place and complaints of abdominal pain. It appears this is patients second episode of eloping. It is unclear if patient is leaving intentionally or due to confusion. Patient is awake and alert, answering questions appropriately but recollection of events do not make sense. "     On arrival here he did not have any complaints, and was oriented x4. He recalls the events of elopement, including wanting to visit his kids to tell them he was in the hospital, getting on a bus, and then losing his way. He recalls switching " busses and asking to go to [facility]. He remembers talking to someone at [facility] about trying to return. He asked a  to go to [facility] which he ended up at [place] [employer]. He otherwise had no new symptoms.     Hospital Course of Principle Problem Addressed:       Acute hepatitis  Chronic hep C  MELD-Na score: 21 at 1/30/2020  7:50 PM  MELD score: 21 at 1/30/2020  7:50 PM  Calculated from:  Serum Creatinine: 0.9 mg/dL (Rounded to 1 mg/dL) at 1/30/2020  7:50 PM  Serum Sodium: 141 mmol/L (Rounded to 137 mmol/L) at 1/30/2020  7:50 PM  Total Bilirubin: 24.3 mg/dL at 1/30/2020  7:50 PM  INR(ratio): 1.3 at 1/30/2020  7:50 PM  Age: 54 years  As per previous documentation:  Unclear etiologDictation #1  MRN:[id number]  CSN:[id number]  y, chronic hep C diagnosis which had not been treat, Hep A IGG was positive but negative IgM.   Autoimmune marker pneding  Meth use  Continue to monitor LFTs and INR  Continue NAC infusion  Hepatology consulted again    - PATIENT DECIDED TO EAT AND THEN LEFT AMA      GERD- protonix     DVT PPx: SCDs      PATIENT LEFT AMA     Other Medical Problems Addressed in the Hospital:         Significant Diagnostic Tests/Imaging:     Recent Labs   Lab 01/29/20 0224 01/30/20  0343 01/30/20 1950 01/31/20  0423   WBC 9.53 8.22 7.96 8.82   HGB 14.6 14.2 14.6 13.5*   HCT 46.0 41.6 43.4 40.8    278 291 283     Recent Labs   Lab 01/29/20 0224 01/29/20  1544 01/30/20  0343 01/30/20 1950 01/31/20  0423    135* 136 141 138   K 3.9 4.2 3.9 3.8 4.0    105 108 111* 108   CO2 27 18* 19* 21* 20*   BUN 12 9 7 9 9   CREATININE 1.1 0.9 0.9 0.9 0.9   CALCIUM 9.2 8.3* 8.2* 8.6* 8.1*   MG 2.1  --  1.7  --  1.8   PHOS 2.7  --  2.2*  --  2.7   PROT 8.0 6.7 6.5 6.8 6.3   BILITOT 26.9* 22.5* 22.0* 24.3* 21.8*   ALKPHOS 229* 175* 186* 182* 173*   ALT 1,073* 875* 853* 889* 820*   AST 2,052* 1,633* 1,601* 1,625* 1,519*         Special Treatments/Procedures:         Discharge Medications:      There are no  discharge medications for this patient.      Discharge Diet:regular diet    Activity: activity as tolerated    Discharge Condition: Serious    Disposition: Left Against Medical Advice    Time spent on the discharge of the patient including review of hospital course with the patient. reviewing discharge medications and arranging follow-up care 31 minutes.  Patient was seen and examined on the date of discharge and determined to be suitable for discharge.    No future appointments.    Kolby Cruz MD  Medical Director [facility] Medicine  Spectra:  [phone]  Pager: [phone]

## 2020-02-01 NOTE — NURSING
Pt set off bed alarm. When arrived to bedside pt fully dressed, ambulating to bathroom. Agitated because bed alarm set. Reoriented to call light.  0133- tech turned on bed alarm once pt returned to bed.

## 2020-02-01 NOTE — PLAN OF CARE
Problem: Adult Inpatient Plan of Care  Goal: Plan of Care Review  Outcome: Ongoing, Progressing  Goal: Patient-Specific Goal (Individualization)  Outcome: Ongoing, Progressing  Goal: Absence of Hospital-Acquired Illness or Injury  Outcome: Ongoing, Progressing  Goal: Optimal Comfort and Wellbeing  Outcome: Ongoing, Progressing  Goal: Readiness for Transition of Care  Outcome: Ongoing, Progressing  Goal: Rounds/Family Conference  Outcome: Ongoing, Progressing     Problem: Adjustment to Illness (Liver Failure)  Goal: Optimal Coping with Liver Failure  Outcome: Ongoing, Progressing     Problem: Bleeding (Liver Failure)  Goal: Absence of Bleeding  Outcome: Ongoing, Progressing     Problem: Neurologic Function Impaired (Liver Failure)  Goal: Optimal Neurologic Function  Outcome: Ongoing, Progressing

## 2020-02-01 NOTE — PLAN OF CARE
Problem: Adult Inpatient Plan of Care  Goal: Plan of Care Review  1/31/2020 1807 by Tabitha Smith RN  Outcome: Ongoing, Progressing  1/31/2020 1807 by Tabitha Smith RN  Outcome: Ongoing, Progressing  Goal: Patient-Specific Goal (Individualization)  1/31/2020 1807 by Tabitha Smith RN  Outcome: Ongoing, Progressing  1/31/2020 1807 by Tabitha Smith RN  Outcome: Ongoing, Progressing  Goal: Absence of Hospital-Acquired Illness or Injury  1/31/2020 1807 by Tabitha Smith RN  Outcome: Ongoing, Progressing  1/31/2020 1807 by Tabitha Smith RN  Outcome: Ongoing, Progressing  Goal: Optimal Comfort and Wellbeing  Outcome: Ongoing, Progressing  Goal: Readiness for Transition of Care  Outcome: Ongoing, Progressing  Goal: Rounds/Family Conference  Outcome: Ongoing, Progressing     Problem: Adjustment to Illness (Liver Failure)  Goal: Optimal Coping with Liver Failure  Outcome: Ongoing, Progressing     Problem: Bleeding (Liver Failure)  Goal: Absence of Bleeding  Outcome: Ongoing, Progressing     Problem: Neurologic Function Impaired (Liver Failure)  Goal: Optimal Neurologic Function  Outcome: Ongoing, Progressing     Problem: Fluid Imbalance (Liver Failure)  Goal: Optimal Fluid Balance  Outcome: Ongoing, Progressing     Problem: Oral Intake Inadequate (Liver Failure)  Goal: Optimal Nutrition Intake  Outcome: Ongoing, Progressing     Problem: Infection (Liver Failure)  Goal: Absence of Infection Signs/Symptoms  Outcome: Ongoing, Progressing

## 2020-02-01 NOTE — PLAN OF CARE
"  Problem: Adult Inpatient Plan of Care  Goal: Plan of Care Review  Outcome: Ongoing, Progressing   POC reviewed with patient and spouse. AAOx4. Continue to monitor VS. Remains on continuous gtt Acetylcysteine running at 24.4 ml/hr. Pt became agitated because bed alarm was set, advised to call prior to ambulating and that bed alarm was for pt safety. Pt threatened to leave AMA around 0400, stating "I know how to disconnect and reconnect my drip" and "I don't need my bed alarm". Noted on white board that bed alarm has been refused. WCTM.  "

## 2020-02-01 NOTE — PLAN OF CARE
Problem: Adult Inpatient Plan of Care  Goal: Plan of Care Review  Outcome: Ongoing, Progressing  Goal: Patient-Specific Goal (Individualization)  Outcome: Ongoing, Progressing  Goal: Absence of Hospital-Acquired Illness or Injury  Outcome: Ongoing, Progressing  Goal: Optimal Comfort and Wellbeing  Outcome: Ongoing, Progressing  Goal: Readiness for Transition of Care  Outcome: Ongoing, Progressing  Goal: Rounds/Family Conference  Outcome: Ongoing, Progressing     Problem: Adjustment to Illness (Liver Failure)  Goal: Optimal Coping with Liver Failure  Outcome: Ongoing, Progressing     Problem: Bleeding (Liver Failure)  Goal: Absence of Bleeding  Outcome: Ongoing, Progressing     Problem: Neurologic Function Impaired (Liver Failure)  Goal: Optimal Neurologic Function  Outcome: Ongoing, Progressing     Problem: Fluid Imbalance (Liver Failure)  Goal: Optimal Fluid Balance  Outcome: Ongoing, Progressing     Problem: Oral Intake Inadequate (Liver Failure)  Goal: Optimal Nutrition Intake  Outcome: Ongoing, Progressing     Problem: Infection (Liver Failure)  Goal: Absence of Infection Signs/Symptoms  Outcome: Ongoing, Progressing

## 2020-02-01 NOTE — PROGRESS NOTES
Progress Note   Hospital Medicine         Patient Name: Hal Bowie  MRN:  [id number]  [facility] Medicine Team: [facility] HOSP MED L Kolby Cruz MD  Date of Admission:  1/30/2020     Length of Stay:  LOS: 1 day   Expected Discharge Date: 2/3/2020  Principal Problem:  Acute hepatitis       Subjective:     Interval History/Overnight Events:  Patient doing well today, LFTs are improving; told patient we have to keep him here until Monday to see what biopsy results show as we want to rule out autoimmune causes and he understands; no acute events over night;     Review of Systems   Constitutional: Negative for chills, fatigue, fever.   HENT: Negative for sore throat, trouble swallowing.    Eyes: Negative for photophobia, visual disturbance.   Respiratory: Negative for cough, shortness of breath.    Cardiovascular: Negative for chest pain, palpitations, leg swelling.   Gastrointestinal: Negative for abdominal pain, constipation, diarrhea, nausea, vomiting.   Endocrine: Negative for cold intolerance, heat intolerance.   Genitourinary: Negative for dysuria, frequency.   Musculoskeletal: Negative for arthralgias, myalgias.   Skin: Negative for rash, wound, erythema   Neurological: Negative for dizziness, syncope, weakness, light-headedness.   Psychiatric/Behavioral: Negative for confusion, hallucinations, anxiety  All other systems reviewed and are negative.    Objective:     Temp:  [97.9 °F (36.6 °C)-99.8 °F (37.7 °C)]   Pulse:  []   Resp:  [16-18]   BP: (120-156)/(65-81)   SpO2:  [95 %-98 %]       Physical Exam:  Constitutional: appears weak and ill  Head: Normocephalic and atraumatic.   Mouth/Throat: Oropharynx is clear and moist.   Eyes: EOM are normal. Pupils are equal, round, and reactive to light. positive scleral icterus.   Neck: Normal range of motion. Neck supple.   Cardiovascular: Normal rate and regular rhythm.  No murmur heard.  Pulmonary/Chest: Effort normal and breath sounds normal. No respiratory distress.  No wheezes, rales, or rhonchi  Abdominal: Soft. Bowel sounds are normal.  No distension or tenderness  Musculoskeletal: Normal range of motion. No edema.   Neurological: Alert and oriented to person, place, and time.   Skin: Skin is warm and dry.   Psychiatric: Normal mood and affect. Behavior is normal.     Recent Labs   Lab 01/29/20  0224 01/30/20 0343 01/30/20 1950 01/31/20 0423 02/01/20  0437   WBC 9.53 8.22 7.96 8.82 8.00   HGB 14.6 14.2 14.6 13.5* 13.3*   HCT 46.0 41.6 43.4 40.8 40.5    278 291 283 287     Recent Labs   Lab 01/30/20 0343 01/30/20 1950 01/31/20 0423 02/01/20 0437    141 138 134*   K 3.9 3.8 4.0 3.9    111* 108 107   CO2 19* 21* 20* 19*   BUN 7 9 9 9   CREATININE 0.9 0.9 0.9 1.0   GLU 86 97 78 98   CALCIUM 8.2* 8.6* 8.1* 8.2*   MG 1.7  --  1.8 1.7   PHOS 2.2*  --  2.7 3.2     Recent Labs   Lab 01/30/20 1950 01/31/20 0423 02/01/20 0437   ALKPHOS 182* 173* 166*   * 820* 698*   AST 1,625* 1,519* 1,166*   ALBUMIN 2.7* 2.4* 2.4*   PROT 6.8 6.3 6.4   BILITOT 24.3* 21.8* 20.6*   INR 1.3* 1.3* 1.5*     No results for input(s): POCTGLUCOSE in the last 168 hours.        Assessment and Plan     Mr. Hal Bowie is a 54 y.o. male who presented to [facility] on 1/30/2020 with     Hospital Course:    Mr. Hal Bowie was admitted to Hospital Medicine for management of     Active Hospital Problems    Diagnosis  POA    *Acute hepatitis [B17.9]  Yes    Methamphetamine abuse [F15.10]  Yes    Gastroesophageal reflux disease without esophagitis [K21.9]  Yes    Jaundice [R17]  Yes    Tobacco abuse [Z72.0]  Yes    Chronic hepatitis C without hepatic coma [B18.2]  Yes      Resolved Hospital Problems   No resolved problems to display.     # Acute hepatitis   # Chronic Hep C without coma   MELD-Na score: 24 at 2/1/2020  4:37 AM  MELD score: 22 at 2/1/2020  4:37 AM  Calculated from:  Serum Creatinine: 1.0 mg/dL at 2/1/2020  4:37 AM  Serum Sodium: 134 mmol/L at 2/1/2020  4:37  AM  Total Bilirubin: 20.6 mg/dL at 2/1/2020  4:37 AM  INR(ratio): 1.5 at 2/1/2020  4:37 AM  Age: 54 years  - hepatology consulted  - does have viral load for Hep C and will eventually need that treated  - s/p liver biopsy and awaiting results, likely by Monday  - LFTs improving   - U/S liver with doppler reviewed, does not show gross cirrhosis, early evidence of portal hypertension     # Tobacco abuse  # Methamphetamine use  - had a long talk about how we do not know exactly what is in the meth that he has taken and can contribute to his LFTs to elevated and to stop taking any illegal drugs and patient understands  - counseled on cessation of tobacco       Diet:  Regular   GI PPx:    DVT PPx:    Goals of Care:  full       Disposition:  Monday     Kolyb Cruz MD  Medical Director [facility] Medicine  Spectra:  [phone]  Pager: [phone]

## 2020-02-02 LAB
ALBUMIN SERPL BCP-MCNC: 2.3 G/DL (ref 3.5–5.2)
ALP SERPL-CCNC: 164 U/L (ref 55–135)
ALT SERPL W/O P-5'-P-CCNC: 632 U/L (ref 10–44)
ANION GAP SERPL CALC-SCNC: 10 MMOL/L (ref 8–16)
AST SERPL-CCNC: 972 U/L (ref 10–40)
BASOPHILS # BLD AUTO: 0.04 K/UL (ref 0–0.2)
BASOPHILS NFR BLD: 0.4 % (ref 0–1.9)
BILIRUB SERPL-MCNC: 20 MG/DL (ref 0.1–1)
BUN SERPL-MCNC: 9 MG/DL (ref 6–20)
CALCIUM SERPL-MCNC: 8.5 MG/DL (ref 8.7–10.5)
CHLORIDE SERPL-SCNC: 109 MMOL/L (ref 95–110)
CO2 SERPL-SCNC: 18 MMOL/L (ref 23–29)
CREAT SERPL-MCNC: 1 MG/DL (ref 0.5–1.4)
DIFFERENTIAL METHOD: ABNORMAL
EOSINOPHIL # BLD AUTO: 0.3 K/UL (ref 0–0.5)
EOSINOPHIL NFR BLD: 3.2 % (ref 0–8)
ERYTHROCYTE [DISTWIDTH] IN BLOOD BY AUTOMATED COUNT: 21.2 % (ref 11.5–14.5)
EST. GFR  (AFRICAN AMERICAN): >60 ML/MIN/1.73 M^2
EST. GFR  (NON AFRICAN AMERICAN): >60 ML/MIN/1.73 M^2
GLUCOSE SERPL-MCNC: 93 MG/DL (ref 70–110)
HCT VFR BLD AUTO: 40.9 % (ref 40–54)
HGB BLD-MCNC: 13.7 G/DL (ref 14–18)
IMM GRANULOCYTES # BLD AUTO: 0.07 K/UL (ref 0–0.04)
IMM GRANULOCYTES NFR BLD AUTO: 0.8 % (ref 0–0.5)
INR PPP: 1.4 (ref 0.8–1.2)
LYMPHOCYTES # BLD AUTO: 2.7 K/UL (ref 1–4.8)
LYMPHOCYTES NFR BLD: 29.8 % (ref 18–48)
MAGNESIUM SERPL-MCNC: 1.6 MG/DL (ref 1.6–2.6)
MCH RBC QN AUTO: 30.1 PG (ref 27–31)
MCHC RBC AUTO-ENTMCNC: 33.5 G/DL (ref 32–36)
MCV RBC AUTO: 90 FL (ref 82–98)
MONOCYTES # BLD AUTO: 1 K/UL (ref 0.3–1)
MONOCYTES NFR BLD: 11.4 % (ref 4–15)
NEUTROPHILS # BLD AUTO: 4.9 K/UL (ref 1.8–7.7)
NEUTROPHILS NFR BLD: 54.4 % (ref 38–73)
NRBC BLD-RTO: 0 /100 WBC
PHOSPHATE SERPL-MCNC: 3.4 MG/DL (ref 2.7–4.5)
PLATELET # BLD AUTO: 283 K/UL (ref 150–350)
PMV BLD AUTO: 12 FL (ref 9.2–12.9)
POTASSIUM SERPL-SCNC: 4.1 MMOL/L (ref 3.5–5.1)
PREALB SERPL-MCNC: 4 MG/DL (ref 20–43)
PROT SERPL-MCNC: 6.8 G/DL (ref 6–8.4)
PROTHROMBIN TIME: 13.7 SEC (ref 9–12.5)
RBC # BLD AUTO: 4.55 M/UL (ref 4.6–6.2)
SODIUM SERPL-SCNC: 137 MMOL/L (ref 136–145)
WBC # BLD AUTO: 9.09 K/UL (ref 3.9–12.7)

## 2020-02-02 PROCEDURE — 99233 PR SUBSEQUENT HOSPITAL CARE,LEVL III: ICD-10-PCS | Mod: ,,, | Performed by: HOSPITALIST

## 2020-02-02 PROCEDURE — 99233 SBSQ HOSP IP/OBS HIGH 50: CPT | Mod: ,,, | Performed by: HOSPITALIST

## 2020-02-02 PROCEDURE — 63600175 PHARM REV CODE 636 W HCPCS: Performed by: INTERNAL MEDICINE

## 2020-02-02 PROCEDURE — 83735 ASSAY OF MAGNESIUM: CPT

## 2020-02-02 PROCEDURE — 85610 PROTHROMBIN TIME: CPT

## 2020-02-02 PROCEDURE — 20600001 HC STEP DOWN PRIVATE ROOM

## 2020-02-02 PROCEDURE — 25000003 PHARM REV CODE 250: Performed by: HOSPITALIST

## 2020-02-02 PROCEDURE — 85025 COMPLETE CBC W/AUTO DIFF WBC: CPT

## 2020-02-02 PROCEDURE — 84134 ASSAY OF PREALBUMIN: CPT

## 2020-02-02 PROCEDURE — 84100 ASSAY OF PHOSPHORUS: CPT

## 2020-02-02 PROCEDURE — 97802 MEDICAL NUTRITION INDIV IN: CPT

## 2020-02-02 PROCEDURE — 80053 COMPREHEN METABOLIC PANEL: CPT

## 2020-02-02 RX ADMIN — ACETYLCYSTEINE 6.25 MG/KG/HR: 200 INJECTION, SOLUTION INTRAVENOUS at 06:02

## 2020-02-02 NOTE — NURSING
2315- IM L paged  Update on bloody stool at beginning of shift. Pain to biopsy site on abdomen. Request PO medication.  7593- Dr. Mims returned call  Advised, new orders pending

## 2020-02-02 NOTE — CONSULTS
"  [facility]  Adult Nutrition  Consult Note    SUMMARY     Recommendations    1. Recommend Low Sodium diet.      2. If po intake <50% of meals, recommend adding Boost Plus with meals.     3. RD following.     Goals: continue to consume >75% of meals by RD follow-up  Nutrition Goal Status: new  Communication of RD Recs: (POC)    Reason for Assessment    Reason For Assessment: consult  Diagnosis: (Acute hepatitis)  Relevant Medical History: chronic hepatitis C, GERD, methamphetamine abuse  Interdisciplinary Rounds: did not attend  General Information Comments: Pt resting in bed with family member sleeping at bedside. Pt reports good appetite and ate 100% of breakfast this AM. Denies N/V. C/o diarrhea today. Pt reports that appetite has been up and down for 1 wk with 1-3lb wt loss PTA. UBW 210lbs per pt. NFPE completed. Pt well-nourished with signs of wasting. RD does not feel that pt meets malnutrition criteria at this time. Will continue to monitor energy intake and wt loss.   Nutrition Discharge Planning: adequate po intake    Nutrition Risk Screen    Nutrition Risk Screen: no indicators present    Nutrition/Diet History    Spiritual, Cultural Beliefs, Mosque Practices, Values that Affect Care: no  Factors Affecting Nutritional Intake: None identified at this time    Anthropometrics    Temp: 98 °F (36.7 °C)  Height Method: Stated  Height: 6' 1" (185.4 cm)  Height (inches): 73 in  Weight Method: Bed Scale  Weight: 90.6 kg (199 lb 11.8 oz)  Weight (lb): 199.74 lb  Ideal Body Weight (IBW), Male: 184 lb  % Ideal Body Weight, Male (lb): 108.55 %  BMI (Calculated): 26.4  BMI Grade: 25 - 29.9 - overweight     Lab/Procedures/Meds    Pertinent Labs Reviewed: reviewed  Pertinent Labs Comments: alk phos 164, T bili 20, ,   Pertinent Medications Reviewed: reviewed    Estimated/Assessed Needs    Weight Used For Calorie Calculations: 90.6 kg (199 lb 11.8 oz)  Energy Calorie Requirements " (kcal): 2242 kcal/day  Energy Need Method: [place]-St Jeor(x 1.25)  Protein Requirements:  gm/day(1.0-1.2 gm/kg)  Weight Used For Protein Calculations: 90.6 kg (199 lb 11.8 oz)  Fluid Requirements (mL): 1 mL/kcal or per MD     RDA Method (mL): 2242     Nutrition Prescription Ordered    Current Diet Order: Regular    Evaluation of Received Nutrient/Fluid Intake    Comments: LBM 2/1  Tolerance: tolerating  % Intake of Estimated Energy Needs: 75 - 100 %  % Meal Intake: 75 - 100 %    Nutrition Risk    Level of Risk/Frequency of Follow-up: (f/u 1 x wk)     Assessment and Plan    Nutrition Problem  Increased Nutrient Needs: Protein     Related to (etiology):   physiological demands    Signs and Symptoms (as evidenced by):   Acute hepatitis     Interventions (treatment strategy):  Collaboration of nutrition care with other providers    Nutrition Diagnosis Status:   New    Monitor and Evaluation    Food and Nutrient Intake: energy intake, food and beverage intake  Food and Nutrient Adminstration: diet order  Physical Activity and Function: nutrition-related ADLs and IADLs  Anthropometric Measurements: weight, weight change, body mass index  Biochemical Data, Medical Tests and Procedures: electrolyte and renal panel, gastrointestinal profile, glucose/endocrine profile, inflammatory profile, lipid profile  Nutrition-Focused Physical Findings: overall appearance     Nutrition Follow-Up    RD Follow-up?: Yes

## 2020-02-02 NOTE — PROGRESS NOTES
Progress Note   [facility] Medicine         Patient Name: Hal Bowie  MRN:  [id number]  [facility] Medicine Team: [facility] MED L Kolby Cruz MD  Date of Admission:  1/30/2020     Length of Stay:  LOS: 2 days   Expected Discharge Date: 2/3/2020  Principal Problem:  Acute hepatitis       Subjective:     Interval History/Overnight Events:  Patient doing well today, LFTs are improving; told patient we have to keep him here until Monday to see what biopsy results show as we want to rule out autoimmune causes and he understands; no acute events over night;   - finishing NAC    Review of Systems   Constitutional: Negative for chills, fatigue, fever.   HENT: Negative for sore throat, trouble swallowing.    Eyes: Negative for photophobia, visual disturbance.   Respiratory: Negative for cough, shortness of breath.    Cardiovascular: Negative for chest pain, palpitations, leg swelling.   Gastrointestinal: Negative for abdominal pain, constipation, diarrhea, nausea, vomiting.   Endocrine: Negative for cold intolerance, heat intolerance.   Genitourinary: Negative for dysuria, frequency.   Musculoskeletal: Negative for arthralgias, myalgias.   Skin: Negative for rash, wound, erythema   Neurological: Negative for dizziness, syncope, weakness, light-headedness.   Psychiatric/Behavioral: Negative for confusion, hallucinations, anxiety  All other systems reviewed and are negative.    Objective:     Temp:  [97.6 °F (36.4 °C)-98.9 °F (37.2 °C)]   Pulse:  [64-80]   Resp:  [16-18]   BP: (112-161)/(63-78)   SpO2:  [96 %-98 %]       Physical Exam:  Constitutional: appears weak and ill  Head: Normocephalic and atraumatic.   Mouth/Throat: Oropharynx is clear and moist.   Eyes: EOM are normal. Pupils are equal, round, and reactive to light. positive scleral icterus.   Neck: Normal range of motion. Neck supple.   Cardiovascular: Normal rate and regular rhythm.  No murmur heard.  Pulmonary/Chest: Effort normal and breath sounds normal. No  respiratory distress. No wheezes, rales, or rhonchi  Abdominal: Soft. Bowel sounds are normal.  No distension or tenderness  Musculoskeletal: Normal range of motion. No edema.   Neurological: Alert and oriented to person, place, and time.   Skin: Skin is warm and dry.   Psychiatric: Normal mood and affect. Behavior is normal.     Recent Labs   Lab 01/29/20  0224 01/30/20  0343 01/30/20  1950 01/31/20  0423 02/01/20  0437 02/02/20  0518   WBC 9.53 8.22 7.96 8.82 8.00 9.09   HGB 14.6 14.2 14.6 13.5* 13.3* 13.7*   HCT 46.0 41.6 43.4 40.8 40.5 40.9    278 291 283 287 283     Recent Labs   Lab 01/31/20  0423 02/01/20  0437 02/02/20  0518    134* 137   K 4.0 3.9 4.1    107 109   CO2 20* 19* 18*   BUN 9 9 9   CREATININE 0.9 1.0 1.0   GLU 78 98 93   CALCIUM 8.1* 8.2* 8.5*   MG 1.8 1.7 1.6   PHOS 2.7 3.2 3.4     Recent Labs   Lab 01/31/20 0423 02/01/20  0437 02/02/20  0518   ALKPHOS 173* 166* 164*   * 698* 632*   AST 1,519* 1,166* 972*   ALBUMIN 2.4* 2.4* 2.3*   PROT 6.3 6.4 6.8   BILITOT 21.8* 20.6* 20.0*   INR 1.3* 1.5* 1.4*     No results for input(s): POCTGLUCOSE in the last 168 hours.        Assessment and Plan     Mr. Hal Bowie is a 54 y.o. male who presented to [facility] on 1/30/2020 with     Hospital Course:    Mr. Hal Bowie was admitted to Hospital Medicine for management of     Active Hospital Problems    Diagnosis  POA    *Acute hepatitis [B17.9]  Yes    Methamphetamine abuse [F15.10]  Yes    Gastroesophageal reflux disease without esophagitis [K21.9]  Yes    Jaundice [R17]  Yes    Tobacco abuse [Z72.0]  Yes    Chronic hepatitis C without hepatic coma [B18.2]  Yes      Resolved Hospital Problems   No resolved problems to display.     # Acute hepatitis   # Chronic Hep C without coma   MELD-Na score: 22 at 2/2/2020  5:18 AM  MELD score: 22 at 2/2/2020  5:18 AM  Calculated from:  Serum Creatinine: 1.0 mg/dL at 2/2/2020  5:18 AM  Serum Sodium: 137 mmol/L at 2/2/2020  5:18  AM  Total Bilirubin: 20.0 mg/dL at 2/2/2020  5:18 AM  INR(ratio): 1.4 at 2/2/2020  5:18 AM  Age: 54 years  - hepatology consulted  - does have viral load for Hep C and will eventually need that treated  - s/p liver biopsy and awaiting results, likely by Monday  - LFTs improving   - U/S liver with doppler reviewed, does not show gross cirrhosis, early evidence of portal hypertension     # Tobacco abuse  # Methamphetamine use  - had a long talk about how we do not know exactly what is in the meth that he has taken and can contribute to his LFTs to elevated and to stop taking any illegal drugs and patient understands  - counseled on cessation of tobacco       Diet:  Regular   GI PPx:    DVT PPx:    Goals of Care:  full       Disposition:  Monday     Kolby Cruz MD  Medical Director [facility] Medicine  Spectra:  [phone]  Pager: [phone]

## 2020-02-02 NOTE — PLAN OF CARE
Problem: Adult Inpatient Plan of Care  Goal: Plan of Care Review  Outcome: Ongoing, Progressing   POC reviewed with patient and spouse. Pt AAOx4. Pt refused bed alarm all of shift. Pt had 2 bloody bowel movements during shift. Pt shut off IV pump on 2 occassions during shift. Advised to call for assistance and to refrain from shutting off continuous IV medication. Pain from liver biopsy site managed with one time PO dose of Oxycodone. New bag of continuous Acetylcysteine running at 24.4 ml/hr. Hourly rounding maintained. Pt remained free of falls. Will continue to monitor.

## 2020-02-03 ENCOUNTER — TELEPHONE (OUTPATIENT)
Dept: HEPATOLOGY | Facility: CLINIC | Age: 55
End: 2020-02-03

## 2020-02-03 VITALS
DIASTOLIC BLOOD PRESSURE: 68 MMHG | OXYGEN SATURATION: 99 % | HEART RATE: 65 BPM | WEIGHT: 197.31 LBS | RESPIRATION RATE: 16 BRPM | SYSTOLIC BLOOD PRESSURE: 115 MMHG | HEIGHT: 73 IN | TEMPERATURE: 98 F | BODY MASS INDEX: 26.15 KG/M2

## 2020-02-03 DIAGNOSIS — B18.2 CHRONIC HEPATITIS C WITHOUT HEPATIC COMA: Primary | ICD-10-CM

## 2020-02-03 PROBLEM — K71.9 DRUG-INDUCED LIVER INJURY: Status: ACTIVE | Noted: 2020-02-03

## 2020-02-03 LAB
ALBUMIN SERPL BCP-MCNC: 2.1 G/DL (ref 3.5–5.2)
ALP SERPL-CCNC: 168 U/L (ref 55–135)
ALT SERPL W/O P-5'-P-CCNC: 508 U/L (ref 10–44)
ANION GAP SERPL CALC-SCNC: 8 MMOL/L (ref 8–16)
AST SERPL-CCNC: 780 U/L (ref 10–40)
BACTERIA BLD CULT: NORMAL
BACTERIA BLD CULT: NORMAL
BASOPHILS # BLD AUTO: 0.05 K/UL (ref 0–0.2)
BASOPHILS NFR BLD: 0.6 % (ref 0–1.9)
BILIRUB SERPL-MCNC: 17.6 MG/DL (ref 0.1–1)
BUN SERPL-MCNC: 9 MG/DL (ref 6–20)
CALCIUM SERPL-MCNC: 8.2 MG/DL (ref 8.7–10.5)
CHLORIDE SERPL-SCNC: 109 MMOL/L (ref 95–110)
CO2 SERPL-SCNC: 20 MMOL/L (ref 23–29)
CREAT SERPL-MCNC: 0.9 MG/DL (ref 0.5–1.4)
DIFFERENTIAL METHOD: ABNORMAL
EOSINOPHIL # BLD AUTO: 0.3 K/UL (ref 0–0.5)
EOSINOPHIL NFR BLD: 3.6 % (ref 0–8)
ERYTHROCYTE [DISTWIDTH] IN BLOOD BY AUTOMATED COUNT: 21.7 % (ref 11.5–14.5)
EST. GFR  (AFRICAN AMERICAN): >60 ML/MIN/1.73 M^2
EST. GFR  (NON AFRICAN AMERICAN): >60 ML/MIN/1.73 M^2
GLUCOSE SERPL-MCNC: 77 MG/DL (ref 70–110)
HCT VFR BLD AUTO: 39.3 % (ref 40–54)
HGB BLD-MCNC: 13.2 G/DL (ref 14–18)
IMM GRANULOCYTES # BLD AUTO: 0.08 K/UL (ref 0–0.04)
IMM GRANULOCYTES NFR BLD AUTO: 0.9 % (ref 0–0.5)
INR PPP: 1.3 (ref 0.8–1.2)
LYMPHOCYTES # BLD AUTO: 2.6 K/UL (ref 1–4.8)
LYMPHOCYTES NFR BLD: 30 % (ref 18–48)
MAGNESIUM SERPL-MCNC: 1.7 MG/DL (ref 1.6–2.6)
MCH RBC QN AUTO: 29.7 PG (ref 27–31)
MCHC RBC AUTO-ENTMCNC: 33.6 G/DL (ref 32–36)
MCV RBC AUTO: 89 FL (ref 82–98)
MONOCYTES # BLD AUTO: 1 K/UL (ref 0.3–1)
MONOCYTES NFR BLD: 11.3 % (ref 4–15)
NEUTROPHILS # BLD AUTO: 4.7 K/UL (ref 1.8–7.7)
NEUTROPHILS NFR BLD: 53.6 % (ref 38–73)
NRBC BLD-RTO: 0 /100 WBC
PHOSPHATE SERPL-MCNC: 2.5 MG/DL (ref 2.7–4.5)
PLATELET # BLD AUTO: 296 K/UL (ref 150–350)
PMV BLD AUTO: 11.5 FL (ref 9.2–12.9)
POTASSIUM SERPL-SCNC: 3.8 MMOL/L (ref 3.5–5.1)
PROT SERPL-MCNC: 6.5 G/DL (ref 6–8.4)
PROTHROMBIN TIME: 13.2 SEC (ref 9–12.5)
RBC # BLD AUTO: 4.44 M/UL (ref 4.6–6.2)
RPR SER QL: REACTIVE
RPR SER-TITR: ABNORMAL {TITER}
SODIUM SERPL-SCNC: 137 MMOL/L (ref 136–145)
WBC # BLD AUTO: 8.68 K/UL (ref 3.9–12.7)

## 2020-02-03 PROCEDURE — 85610 PROTHROMBIN TIME: CPT

## 2020-02-03 PROCEDURE — 84100 ASSAY OF PHOSPHORUS: CPT

## 2020-02-03 PROCEDURE — 36415 COLL VENOUS BLD VENIPUNCTURE: CPT

## 2020-02-03 PROCEDURE — 83735 ASSAY OF MAGNESIUM: CPT

## 2020-02-03 PROCEDURE — 80053 COMPREHEN METABOLIC PANEL: CPT

## 2020-02-03 PROCEDURE — 99239 HOSP IP/OBS DSCHRG MGMT >30: CPT | Mod: ,,, | Performed by: HOSPITALIST

## 2020-02-03 PROCEDURE — 85025 COMPLETE CBC W/AUTO DIFF WBC: CPT

## 2020-02-03 PROCEDURE — 99239 PR HOSPITAL DISCHARGE DAY,>30 MIN: ICD-10-PCS | Mod: ,,, | Performed by: HOSPITALIST

## 2020-02-03 PROCEDURE — 63600175 PHARM REV CODE 636 W HCPCS: Performed by: INTERNAL MEDICINE

## 2020-02-03 RX ADMIN — ACETYLCYSTEINE 6.25 MG/KG/HR: 200 INJECTION, SOLUTION INTRAVENOUS at 08:02

## 2020-02-03 NOTE — PROGRESS NOTES
Progress Note   Hospital Medicine         Patient Name: Hal Bowie  MRN:  [id number]  [facility] Medicine Team: [facility]OS[facility] MED L Kolby Cruz MD  Date of Admission:  1/30/2020     Length of Stay:  LOS: 3 days   Expected Discharge Date: 2/3/2020  Principal Problem:  Acute hepatitis       Subjective:     Interval History/Overnight Events:  Patient doing well today, LFTs are improving; liver biopsy consistent with Drug induced liver injury, likely something that was in his meth that he took, told patient to stop doing any kind of drugs and that he needs to be treated for Hep C as an outpatient and he states he will follow up with his PCP    Review of Systems   Constitutional: Negative for chills, fatigue, fever.   HENT: Negative for sore throat, trouble swallowing.    Eyes: Negative for photophobia, visual disturbance.   Respiratory: Negative for cough, shortness of breath.    Cardiovascular: Negative for chest pain, palpitations, leg swelling.   Gastrointestinal: Negative for abdominal pain, constipation, diarrhea, nausea, vomiting.   Endocrine: Negative for cold intolerance, heat intolerance.   Genitourinary: Negative for dysuria, frequency.   Musculoskeletal: Negative for arthralgias, myalgias.   Skin: Negative for rash, wound, erythema   Neurological: Negative for dizziness, syncope, weakness, light-headedness.   Psychiatric/Behavioral: Negative for confusion, hallucinations, anxiety  All other systems reviewed and are negative.    Objective:     Temp:  [98.1 °F (36.7 °C)-99.9 °F (37.7 °C)]   Pulse:  [62-77]   Resp:  [16-18]   BP: (115-139)/(63-86)   SpO2:  [94 %-99 %]       Physical Exam:  Constitutional: appears weak and ill  Head: Normocephalic and atraumatic.   Mouth/Throat: Oropharynx is clear and moist.   Eyes: EOM are normal. Pupils are equal, round, and reactive to light. positive scleral icterus.   Neck: Normal range of motion. Neck supple.   Cardiovascular: Normal rate and regular rhythm.  No murmur  heard.  Pulmonary/Chest: Effort normal and breath sounds normal. No respiratory distress. No wheezes, rales, or rhonchi  Abdominal: Soft. Bowel sounds are normal.  No distension or tenderness  Musculoskeletal: Normal range of motion. No edema.   Neurological: Alert and oriented to person, place, and time.   Skin: Skin is warm and dry.   Psychiatric: Normal mood and affect. Behavior is normal.     Recent Labs   Lab 01/30/20  0343 01/30/20  1950 01/31/20  0423 02/01/20  0437 02/02/20  0518 02/03/20  0715   WBC 8.22 7.96 8.82 8.00 9.09 8.68   HGB 14.2 14.6 13.5* 13.3* 13.7* 13.2*   HCT 41.6 43.4 40.8 40.5 40.9 39.3*    291 283 287 283 296     Recent Labs   Lab 02/01/20 0437 02/02/20  0518 02/03/20  0715   * 137 137   K 3.9 4.1 3.8    109 109   CO2 19* 18* 20*   BUN 9 9 9   CREATININE 1.0 1.0 0.9   GLU 98 93 77   CALCIUM 8.2* 8.5* 8.2*   MG 1.7 1.6 1.7   PHOS 3.2 3.4 2.5*     Recent Labs   Lab 02/01/20 0437 02/02/20  0518 02/03/20  0715   ALKPHOS 166* 164* 168*   * 632* 508*   AST 1,166* 972* 780*   ALBUMIN 2.4* 2.3* 2.1*   PROT 6.4 6.8 6.5   BILITOT 20.6* 20.0* 17.6*   INR 1.5* 1.4* 1.3*     No results for input(s): POCTGLUCOSE in the last 168 hours.        Assessment and Plan     Mr. Hal Bowie is a 55 y.o. male who presented to [facility] on 1/30/2020 with     Hospital Course:    Mr. Hal Bowie was admitted to Hospital Medicine for management of     Active Hospital Problems    Diagnosis  POA    *Acute hepatitis [B17.9]  Yes    Drug-induced liver injury [K71.9]  Yes    Methamphetamine abuse [F15.10]  Yes    Gastroesophageal reflux disease without esophagitis [K21.9]  Yes    Jaundice [R17]  Yes    Tobacco abuse [Z72.0]  Yes    Chronic hepatitis C without hepatic coma [B18.2]  Yes      Resolved Hospital Problems   No resolved problems to display.     # Acute hepatitis   # Drug Induced Liver Injury    # Chronic Hep C without coma   MELD-Na score: 20 at 2/3/2020  7:15 AM  MELD score:  20 at 2/3/2020  7:15 AM  Calculated from:  Serum Creatinine: 0.9 mg/dL (Rounded to 1 mg/dL) at 2/3/2020  7:15 AM  Serum Sodium: 137 mmol/L at 2/3/2020  7:15 AM  Total Bilirubin: 17.6 mg/dL at 2/3/2020  7:15 AM  INR(ratio): 1.3 at 2/3/2020  7:15 AM  Age: 55 years  - hepatology consulted  - does have viral load for Hep C and will eventually need that treated  - s/p liver biopsy which shows DILI, likely from his meth use and patient explained this  - LFTs improving   - U/S liver with doppler reviewed, does not show gross cirrhosis, early evidence of portal hypertension   - patient has a positive Hep C viral load and told he needs to get it treated and will follow up with either our hepatology or a PCP in [place], but understands it needs to be treated;     # Tobacco abuse  # Methamphetamine use  - had a long talk about how we do not know exactly what is in the meth that he has taken and can contribute to his LFTs to elevated and to stop taking any illegal drugs and patient understands  - counseled on cessation of tobacco       Diet:  Regular   GI PPx:    DVT PPx:    Goals of Care:  full       Disposition:  Today with outpatient Hep C treatment      Kolby Cruz MD  Medical Director [facility]  Spectra:  [phone]  Pager: [phone]

## 2020-02-03 NOTE — NURSING
PIV removed. VSS. Discharge instructions reviewed with pt. Verbalizes understanding. States sister bought [place] ticket but needs a ride to [place], spoke to TIANA Jarrett. CM states that she will go speak to pt.

## 2020-02-03 NOTE — PLAN OF CARE
Problem: Adjustment to Illness (Liver Failure)  Goal: Optimal Coping with Liver Failure  Outcome: Ongoing, Not Progressing  Intervention: Support Patient/Family Psychosocial Response to Liver Disease  Flowsheets (Taken 2/3/2020 7999)  Supportive Measures: active listening utilized  Diversional Activities: television  Family/Support System Care: self-care encouraged     Problem: Neurologic Function Impaired (Liver Failure)  Goal: Optimal Neurologic Function  Outcome: Ongoing, Not Progressing  Intervention: Protect and Optimize Cognitive Function  Flowsheets (Taken 2/3/2020 4856)  Seizure Precautions: clutter-free environment maintained; emergency equipment at bedside  Sensory Stimulation Regulation: care clustered; music/television provided for relaxation   POC discussed with patient. Patient alert and oriented. No acute changes overnight. Patient bed is in the lowest position, with the wheels locked, and the call light within reach.

## 2020-02-03 NOTE — PLAN OF CARE
ISA called [facility] ([phone]) and got a ring then a busy signal.  ISA called number back three times but got the same result.  ISA then called  customer service at [phone] and spoke with iTff[employer] who stated that they are having issues with placing rides for pts at this time.  The wait time could be anywhere between 6-24 hours before they know if they can accommodate pt.  ISA and CM will talk to pt about possibly getting another ride.        Jeannie Gonzales LMSW

## 2020-02-03 NOTE — DISCHARGE SUMMARY
"Discharge Summary  Hospital Medicine    Patient Name: Hal Bowie  MRN:  [id number]  Hospital Medicine Team: [facility] MED L Kolby Cruz MD  Date of Admission:  1/30/2020     Date of Discharge:  02/03/2020  Length of Stay:  LOS: 3 days   Principal Problem:  Acute hepatitis     Active Hospital Problems    Diagnosis  POA    *Acute hepatitis [B17.9]  Yes    Drug-induced liver injury [K71.9]  Yes    Methamphetamine abuse [F15.10]  Yes    Gastroesophageal reflux disease without esophagitis [K21.9]  Yes    Jaundice [R17]  Yes    Tobacco abuse [Z72.0]  Yes    Chronic hepatitis C without hepatic coma [B18.2]  Yes      Resolved Hospital Problems   No resolved problems to display.       History of Present Illness:      Hal Bowie is a/an 54 y.o. male with history of drug use, presenting with acute liver failure and jaundice. He was seen 2 days ago and admitted and ended up eloping.  As per  note, "Patient is a 54 y.o. male who has a past medical history of Drug use presented with acute liver failure and jaundice. Work up in ED significant for acute liver failure with T. Bili 22, AST 1600, ALT 853 and positive HCV antibody. US and CT abdomen unremarkable except fatty liver and contacted gallbladder. He endorses using IV methamphetamine last summer up until one month ago. Patient was admitted to [facility] on 1/28 and then eloped early this evening. Details of what happened are unclear. He then showed up to [facility] ED with IV in place and complaints of abdominal pain. It appears this is patients second episode of eloping. It is unclear if patient is leaving intentionally or due to confusion. Patient is awake and alert, answering questions appropriately but recollection of events do not make sense. "     On arrival here he did not have any complaints, and was oriented x4. He recalls the events of elopement, including wanting to visit his kids to tell them he was in the hospital, getting on a bus, and then losing his " way. He recalls switching busses and asking to go to [facility]. He remembers talking to someone at [facility] about trying to return. He asked a  [facility] which he ended up at [place]. He otherwise had no new symptoms.        Hospital Course of Principle Problem Addressed:       # Acute hepatitis   # Drug Induced Liver Injury    # Chronic Hep C without coma   MELD-Na score: 20 at 2/3/2020  7:15 AM  MELD score: 20 at 2/3/2020  7:15 AM  Calculated from:  Serum Creatinine: 0.9 mg/dL (Rounded to 1 mg/dL) at 2/3/2020  7:15 AM  Serum Sodium: 137 mmol/L at 2/3/2020  7:15 AM  Total Bilirubin: 17.6 mg/dL at 2/3/2020  7:15 AM  INR(ratio): 1.3 at 2/3/2020  7:15 AM  Age: 55 years  - hepatology consulted  - does have viral load for Hep C and will eventually need that treated  - s/p liver biopsy which shows DILI, likely from his meth use and patient explained this  - LFTs improving   - U/S liver with doppler reviewed, does not show gross cirrhosis, early evidence of portal hypertension   - patient has a positive Hep C viral load and told he needs to get it treated and will follow up with either our hepatology or a PCP in [place], but understands it needs to be treated;      # Tobacco abuse  # Methamphetamine use  - had a long talk about how we do not know exactly what is in the meth that he has taken and can contribute to his LFTs to elevated and to stop taking any illegal drugs and patient understands  - counseled on cessation of tobacco         Diet:  Regular   GI PPx:    DVT PPx:    Goals of Care:  full        Disposition:  Today with outpatient Hep C treatment       Kolby Cruz MD  Medical Director [facility] Medicine  Spectra:  [phone]  Pager: [phone]       Other Medical Problems Addressed in the Hospital:         Significant Diagnostic Tests/Imaging:     Recent Labs   Lab 01/30/20  1950 01/31/20  0423 02/01/20  0437 02/02/20  0518 02/03/20  0715   WBC 7.96 8.82 8.00 9.09 8.68   HGB 14.6 13.5*  13.3* 13.7* 13.2*   HCT 43.4 40.8 40.5 40.9 39.3*    283 287 283 296     Recent Labs   Lab 01/30/20  0343 01/30/20  1950 01/31/20  0423 02/01/20  0437 02/02/20  0518 02/03/20  0715    141 138 134* 137 137   K 3.9 3.8 4.0 3.9 4.1 3.8    111* 108 107 109 109   CO2 19* 21* 20* 19* 18* 20*   BUN 7 9 9 9 9 9   CREATININE 0.9 0.9 0.9 1.0 1.0 0.9   CALCIUM 8.2* 8.6* 8.1* 8.2* 8.5* 8.2*   MG 1.7  --  1.8 1.7 1.6 1.7   PHOS 2.2*  --  2.7 3.2 3.4 2.5*   PROT 6.5 6.8 6.3 6.4 6.8 6.5   BILITOT 22.0* 24.3* 21.8* 20.6* 20.0* 17.6*   ALKPHOS 186* 182* 173* 166* 164* 168*   * 889* 820* 698* 632* 508*   AST 1,601* 1,625* 1,519* 1,166* 972* 780*         Special Treatments/Procedures:         Discharge Medications:      There are no discharge medications for this patient.      Discharge Diet:regular diet    Activity: activity as tolerated    Discharge Condition: Good    Disposition: Home or Self Care    Time spent on the discharge of the patient including review of hospital course with the patient. reviewing discharge medications and arranging follow-up care 35 minutes.  Patient was seen and examined on the date of discharge and determined to be suitable for discharge.    No future appointments.    Kolby Cruz MD  Medical Director [facility] Medicine  Spectra:  [phone]  Pager: [phone]

## 2020-02-03 NOTE — TELEPHONE ENCOUNTER
----- Message from Rosanna Santana, RN sent at 2/3/2020 11:17 AM CST -----  This is a new hepatology referral for hep c treatment.  This gentleman was seen inpatient.  He is actively using IV drugs.  He signed out AMA and returned to the hospital for unknown reasons.  I would not make an appointment for him without speaking to him and him  Confirming his wanting to come for treatment.    Thank you.  Malathi

## 2020-02-04 LAB
FINAL PATHOLOGIC DIAGNOSIS: NORMAL
GROSS: NORMAL
Lab: NORMAL
SUPPLEMENTAL DIAGNOSIS: NORMAL

## 2020-02-04 NOTE — PLAN OF CARE
02/04/20 1108   Final Note   Assessment Type Final Discharge Note   Anticipated Discharge Disposition Home   What phone number can be called within the next 1-3 days to see how you are doing after discharge? [phone]   Right Care Referral Info   Post Acute Recommendation No Care   Post-Acute Status   Post-Acute Authorization Other   Other Status No Post-Acute Service Needs   Discharge Delays None known at this time

## 2020-02-05 ENCOUNTER — PATIENT OUTREACH (OUTPATIENT)
Dept: ADMINISTRATIVE | Facility: CLINIC | Age: 55
End: 2020-02-05

## 2020-02-05 LAB
PHOSPHATIDYLETHANOL (PETH): NEGATIVE NG/ML
T PALLIDUM AB SER QL IF: REACTIVE

## 2020-02-05 NOTE — PATIENT INSTRUCTIONS
Hepatitis, Viral:Test Result Pending    Hepatitis is a contagious viral infection of the liver. There are three common types: hepatitis A, B, and C. You are being tested to determine which type you have. Until you know which type of hepatitis it is, you should practice precautions for all types of hepatitis, as described below.  Causes  The most common causes of hepatitis are viruses. Alcohol and drug abuse, chemical toxins, and autoimmune disorders can also cause hepatitis.  When a virus causes hepatitis, it is called viral hepatitis. The hepatitis viruses A, B, and C commonly cause viral hepatitis. Other viral infection can also cause hepatitis, such as the viruses that cause mononucleosis and chicken pox.  What all the hepatic (liver) viruses have in common is that once they are transmitted to you, they infect the liver and then cause inflammation (hepatitis). The viruses are spread in different ways, but all can affect your health over a long time. Potential complications include cirrhosis, liver cancer, and liver failure.  Type A hepatitis  This disease is usually passed by swallowing food or water contaminated with the hepatitis A virus. It is also passed by close household contact with another person who has this illness. Symptoms begin from 2 to 6 weeks after exposure.  Type B hepatitis  This disease is passed by contact with the blood (such as by sharing needles, syringes, or snorting straws) or sexual contact with a person who has the hepatitis B virus. Symptoms begin 2 to 6 months after exposure.  Type C hepatitis  This disease is usually passed by contact with blood (such as by sharing needles or syringes) from a person who has the hepatitis C virus. Less common causes for hepatitis C include sexual contact, living in the same house with someone who has the virus, sharing razors or toothbrushes with an infected person, receiving a tattoo with a dirty needle, or sharing snorting  straws.  Symptoms  Symptoms of hepatitis A and B include:  · Fever  · Nausea or vomiting  · Loss of appetite  · Chronic fatigue or weakness  · Dark-colored urine  · Light-colored stool  · Aching joints  · Yellow color of the skin or eyes (jaundice)  Type A hepatitis is generally a mild illness, with symptoms lasting from 2 to 6 weeks. Type B hepatitis is a more severe illness. Hepatitis B symptoms usually last from 1 to 3 months. Hepatitis C does not cause any symptoms in 2/3 of people who have it. When symptoms do occur, they are usually very mild. Fatigue, nausea, and loss of appetite are common symptoms of hepatitis C.  Home care  · A diet low in saturated fats and high in fruits and vegetables is best for you and your liver. Small, frequent meals are best if you experience nausea.  · If you are having symptoms of hepatitis, you may fatigue easily. Get lots of rest. Don't exert yourself too much.  · Acetaminophen and anti-inflammatory drugs such as ibuprofen and naproxen can be toxic to the liver in high doses, with prolonged use, or in the presence of existing liver damage.  ¨ If you have hepatitis, you should not take these medicines until you talk about them with your healthcare provider.  ¨ If you have only mild or no liver damage from chronic hepatitis, you may take acetaminophen in low doses (2 grams every 24 hours). Do not take anti-inflammatory medicines. Never take acetaminophen with alcohol, since this increases the risk of liver damage.  · Alcohol stresses the liver. It should be avoided until all symptoms have cleared and liver tests are normal.  Preventing the spread of hepatitis  · Wash your hands often, especially after you use the bathroom.  · Parents caring for a baby with hepatitis should use disposable diapers. Wash hands after changing the baby.  ·  workers should not work until cleared by their healthcare providers.  · Inform sex partners of your illness. Do not have sex without a  condom until the virus has been eliminated from your system. This may reduce the risk of transmission, although it is not a guarantee.  · Never share needles, syringes, or tattoo equipment.  · Do not attempt to donate blood.  · Do not share razors or toothbrushes.  · If you need medical or dental care, inform the staff that you have hepatitis so that extra care may be taken to prevent the spread of infection.  · If you are pregnant or plan to become pregnant, notify your physician. Hepatitis can be transmitted to the fetus.  · People who live with you should contact their healthcare providers or the local public health department as soon as possible. This is so they can be tested and receive immunization. People who are exposed to you in any of the ways described in the Causes section above should also seek testing and treatment. An immunization can be given up to 2 weeks after a person is exposed. (Vaccines are available for hepatitis A and B only. There is no vaccine for hepatitis C, although it can be treated.)  Follow-up care  Follow up with your healthcare provider, or as advised to get the results of your hepatitis test.  If X-rays, a CT scan, or an ultrasound were done, they will be reviewed by a specialist. You will be notified of the results, especially if they affect treatment.  Call 911  Call emergency services right away if any of these occur:  · Trouble breathing or swallowing, wheezing  · Confusion  · Extreme drowsiness or trouble awakening  · Fainting or loss of consciousness  · Rapid heart rate  When to seek medical advice  Call your healthcare provider right away if any of these occur:  · Frequent vomiting  · Weight loss from poor appetite  · Increase in abdominal pain or swelling  · Increasing drowsiness or confusion  · Weakness or dizziness  · New or increasing yellow color of skin or eyes  · Bleeding from the gums or nose, or easy bruising  Date Last Reviewed: 6/16/2015  © 1486-5182 [employer]. [address] [place] [address], [place] [address]. All rights reserved. This information is not intended as a substitute for professional medical care. Always follow your healthcare professional's instructions.

## 2020-02-06 ENCOUNTER — CONFERENCE (OUTPATIENT)
Dept: TRANSPLANT | Facility: CLINIC | Age: 55
End: 2020-02-06

## 2020-02-06 NOTE — PHYSICIAN QUERY
PT Name: Hal Bowie  MR #: [id number]    Physician Query Form - Pathology Findings Clarification     CDS/: Candie Mayer RN, CDS               Contact information: [email or web address]                                                                                                                        [phone]    This form is a permanent document in the medical record.     Query Date: February 6, 2020      By submitting this query, we are merely seeking further clarification of documentation.  Please utilize your independent clinical judgment when addressing the question(s) below.      The medical record contains the following:     Findings Supporting Clinical Information Location in Medical Record   Acute cholestatic hepatitis  54 y.o. male with history of drug use, presenting with acute liver failure and jaundice    Assessment and Plan:  Acute hepatitis    FINAL PATHOLOGIC DIAGNOSIS  LIVER, BIOPSY:  Acute cholestatic hepatitis H&P 1/31      H&P 1/31       Path Report      Collected: 1/31     Resulted: 2/4         Please document the clinical significance of the Pathologists findings of ___Acute cholestatic hepatitis___.    [ x  ] I agree with the Pathology Findings   [   ] I do not agree with the Pathology Findings   [   ] Other/Clarification of Findings:   [  ] Clinically Undetermined       Please document in your progress notes daily for the duration of treatment until resolved and include in your discharge summary.

## 2020-02-06 NOTE — PHYSICIAN QUERY
"PT Name: Hal Bowie  MR #: [id number]    Physician Query Form - Cause and Effect Relationship Clarification      CDS/: Candie Mayer RN, CDS               Contact information: [email or web address]                                                                                                                        [phone]    This form is a permanent document in the medical record.     Query Date: February 6, 2020    By submitting this query, we are merely seeking further clarification of documentation. Please utilize your independent clinical judgment when addressing the question(s) below.    The Medical record contains the following:  Supporting Clinical Findings   Location in record    54 y.o. male with history of drug use, presenting with acute liver failure and jaundice. He was seen 2 days ago and admitted and ended up eloping.  As per  note, "Patient is a 54 y.o. male who has a past medical history of Drug use presented with acute liver failure and jaundice. Work up in ED significant for acute liver failure with T. Bili 22, AST 1600, ALT 853 and positive HCV antibody. US and CT abdomen unremarkable except fatty liver and contacted gallbladder                                                                                                                                                                                       H&P 1/31   Assessment and Plan:  Acute hepatitis                                                                                       Unclear etiology                                                                                       chronic hep C diagnosis which had not been treat, Hep A IGG was positive but negative IgM.   Autoimmune marker pneding  Meth use  Continue to monitor LFTs and INR  Continue NAC infusion  Hepatology consulted again          H&P 1/31   s/p liver biopsy which shows DILI, likely from his meth use   DC Summary 2/3   FINAL PATHOLOGIC " DIAGNOSIS  LIVER, BIOPSY:  Acute cholestatic hepatitis   The liver biopsy shows lobular, portal and interface inflammation comprising of plasma cells, lymphocytes,  eosinophils and few neutrophils. There is cholestasis and individual hepatocyte necrosis (apoptosis).  Hepatocellular regeneration is also evident. No significant bile ductular proliferation or bile duct injury is identified.  These features are suggestive of drug induced, acute infection or combination of both. Primary duct related issues are less favored. Path Report      Collected: 1/31      Resulted: 2/4         Provider, please clarify if there is any correlation between __Acute cholestatic hepatitis__ and __Drug induced liver injury__.           Are the conditions:      [x  ] Due to or associated with each other   [  ] Unrelated to each other   [  ] Other (Please Specify): _________________________   [  ] Clinically Undetermined

## 2020-02-20 ENCOUNTER — DOCUMENTATION ONLY (OUTPATIENT)
Dept: TRANSPLANT | Facility: CLINIC | Age: 55
End: 2020-02-20

## 2020-02-20 NOTE — LETTER
February 20, 2020    Hal Bowie  [address] [place] [id number]      Dear Hal Bowie:    Your doctor has referred you to the [facility]. We are sending this letter to remind you to make an appointment with us to complete the referral process.     Please call us at [phone] to schedule an appointment. We look forward to seeing you soon.     If you received a call and have been scheduled, please disregard this letter.       Sincerely,        [facility]   [address] [place] [id number]  [phone]

## 2020-02-20 NOTE — NURSING
Pt records reviewed.   Pt will be referred to Hepatitis C clinic  Chronic hepatitis C without hepatic coma  Initial referral received  from the [facility].   Referring Provider/diagnosis      Referral letter sent to  patient.

## 2020-04-20 ENCOUNTER — TELEPHONE (OUTPATIENT)
Dept: TRANSPLANT | Facility: CLINIC | Age: 55
End: 2020-04-20